# Patient Record
Sex: MALE | Race: BLACK OR AFRICAN AMERICAN | Employment: PART TIME | RURAL
[De-identification: names, ages, dates, MRNs, and addresses within clinical notes are randomized per-mention and may not be internally consistent; named-entity substitution may affect disease eponyms.]

---

## 2022-01-11 ENCOUNTER — TELEPHONE (OUTPATIENT)
Dept: FAMILY MEDICINE CLINIC | Age: 42
End: 2022-01-11

## 2022-02-28 ENCOUNTER — OFFICE VISIT (OUTPATIENT)
Dept: FAMILY MEDICINE CLINIC | Age: 42
End: 2022-02-28
Payer: MEDICAID

## 2022-02-28 VITALS
WEIGHT: 315 LBS | SYSTOLIC BLOOD PRESSURE: 145 MMHG | DIASTOLIC BLOOD PRESSURE: 84 MMHG | OXYGEN SATURATION: 99 % | RESPIRATION RATE: 22 BRPM | HEART RATE: 80 BPM | HEIGHT: 74 IN | TEMPERATURE: 98.3 F | BODY MASS INDEX: 40.43 KG/M2

## 2022-02-28 DIAGNOSIS — R74.8 ELEVATED LIVER ENZYMES: ICD-10-CM

## 2022-02-28 DIAGNOSIS — Z79.899 ENCOUNTER FOR LONG-TERM (CURRENT) USE OF OTHER MEDICATIONS: ICD-10-CM

## 2022-02-28 DIAGNOSIS — I10 BENIGN HYPERTENSION: Primary | ICD-10-CM

## 2022-02-28 DIAGNOSIS — E11.9 TYPE 2 DIABETES MELLITUS WITHOUT COMPLICATION, WITHOUT LONG-TERM CURRENT USE OF INSULIN (HCC): ICD-10-CM

## 2022-02-28 DIAGNOSIS — E78.2 MIXED HYPERLIPIDEMIA: ICD-10-CM

## 2022-02-28 PROBLEM — E66.01 MORBID OBESITY (HCC): Status: ACTIVE | Noted: 2022-02-28

## 2022-02-28 PROBLEM — G47.33 OBSTRUCTIVE SLEEP APNEA SYNDROME: Status: ACTIVE | Noted: 2022-02-28

## 2022-02-28 LAB — HBA1C MFR BLD HPLC: 6.7 %

## 2022-02-28 PROCEDURE — 99204 OFFICE O/P NEW MOD 45 MIN: CPT | Performed by: FAMILY MEDICINE

## 2022-02-28 PROCEDURE — 83036 HEMOGLOBIN GLYCOSYLATED A1C: CPT | Performed by: FAMILY MEDICINE

## 2022-02-28 RX ORDER — METFORMIN HYDROCHLORIDE 1000 MG/1
1000 TABLET ORAL 2 TIMES DAILY WITH MEALS
Qty: 60 TABLET | Refills: 5 | Status: SHIPPED | OUTPATIENT
Start: 2022-02-28 | End: 2022-09-29 | Stop reason: SDUPTHER

## 2022-02-28 RX ORDER — METOPROLOL TARTRATE 100 MG/1
100 TABLET ORAL 2 TIMES DAILY
Qty: 60 TABLET | Refills: 5 | Status: SHIPPED | OUTPATIENT
Start: 2022-02-28 | End: 2022-08-23

## 2022-02-28 RX ORDER — HYDROCHLOROTHIAZIDE 25 MG/1
25 TABLET ORAL DAILY
COMMUNITY
Start: 2022-01-05 | End: 2022-03-30 | Stop reason: SDUPTHER

## 2022-02-28 RX ORDER — HYDRALAZINE HYDROCHLORIDE 50 MG/1
TABLET, FILM COATED ORAL
COMMUNITY
Start: 2021-12-02 | End: 2022-03-30 | Stop reason: SDUPTHER

## 2022-02-28 RX ORDER — AMLODIPINE BESYLATE 10 MG/1
10 TABLET ORAL DAILY
COMMUNITY
Start: 2022-02-02 | End: 2022-03-30 | Stop reason: SDUPTHER

## 2022-02-28 RX ORDER — METOPROLOL TARTRATE 50 MG/1
100 TABLET ORAL 2 TIMES DAILY
COMMUNITY
Start: 2022-02-08 | End: 2022-02-28 | Stop reason: SDUPTHER

## 2022-02-28 RX ORDER — ATORVASTATIN CALCIUM 40 MG/1
TABLET, FILM COATED ORAL
COMMUNITY
Start: 2022-01-31 | End: 2022-03-17 | Stop reason: SDUPTHER

## 2022-02-28 NOTE — PROGRESS NOTES
1. \"Have you been to the ER, urgent care clinic since your last visit? Hospitalized since your last visit? \" No    2. \"Have you seen or consulted any other health care providers outside of the [address] [place] [address] since your last visit? \" No     3. For patients aged 39-70: Has the patient had a colonoscopy / FIT/ Cologuard? NA - based on age      If the patient is female:    4. For patients aged 41-77: Has the patient had a mammogram within the past 2 years? NA - based on age or sex      11. For patients aged 21-65: Has the patient had a pap smear?  NA - based on age or sex    Health Maintenance Due   Topic Date Due    Hepatitis C Screening  Never done    Depression Screen  Never done    DTaP/Tdap/Td series (1 - Tdap) 02/08/2019    Lipid Screen  Never done

## 2022-02-28 NOTE — PATIENT INSTRUCTIONS
DASH Diet: Care Instructions  Your Care Instructions     The DASH diet is an eating plan that can help lower your blood pressure. DASH stands for Dietary Approaches to Stop Hypertension. Hypertension is high blood pressure. The DASH diet focuses on eating foods that are high in calcium, potassium, and magnesium. These nutrients can lower blood pressure. The foods that are highest in these nutrients are fruits, vegetables, low-fat dairy products, nuts, seeds, and legumes. But taking calcium, potassium, and magnesium supplements instead of eating foods that are high in those nutrients does not have the same effect. The DASH diet also includes whole grains, fish, and poultry. The DASH diet is one of several lifestyle changes your doctor may recommend to lower your high blood pressure. Your doctor may also want you to decrease the amount of sodium in your diet. Lowering sodium while following the DASH diet can lower blood pressure even further than just the DASH diet alone. Follow-up care is a key part of your treatment and safety. Be sure to make and go to all appointments, and call your doctor if you are having problems. It's also a good idea to know your test results and keep a list of the medicines you take. How can you care for yourself at home? Following the DASH diet  · Eat 4 to 5 servings of fruit each day. A serving is 1 medium-sized piece of fruit, ½ cup chopped or canned fruit, 1/4 cup dried fruit, or 4 ounces (½ cup) of fruit juice. Choose fruit more often than fruit juice. · Eat 4 to 5 servings of vegetables each day. A serving is 1 cup of lettuce or raw leafy vegetables, ½ cup of chopped or cooked vegetables, or 4 ounces (½ cup) of vegetable juice. Choose vegetables more often than vegetable juice. · Get 2 to 3 servings of low-fat and fat-free dairy each day. A serving is 8 ounces of milk, 1 cup of yogurt, or 1 ½ ounces of cheese. · Eat 6 to 8 servings of grains each day.  A serving is 1 slice of bread, 1 ounce of dry cereal, or ½ cup of cooked rice, pasta, or cooked cereal. Try to choose whole-grain products as much as possible. · Limit lean meat, poultry, and fish to 2 servings each day. A serving is 3 ounces, about the size of a deck of cards. · Eat 4 to 5 servings of nuts, seeds, and legumes (cooked dried beans, lentils, and split peas) each week. A serving is 1/3 cup of nuts, 2 tablespoons of seeds, or ½ cup of cooked beans or peas. · Limit fats and oils to 2 to 3 servings each day. A serving is 1 teaspoon of vegetable oil or 2 tablespoons of salad dressing. · Limit sweets and added sugars to 5 servings or less a week. A serving is 1 tablespoon jelly or jam, ½ cup sorbet, or 1 cup of lemonade. · Eat less than 2,300 milligrams (mg) of sodium a day. If you limit your sodium to 1,500 mg a day, you can lower your blood pressure even more. · Be aware that all of these are the suggested number of servings for people who eat 1,800 to 2,000 calories a day. Your recommended number of servings may be different if you need more or fewer calories. Tips for success  · Start small. Do not try to make dramatic changes to your diet all at once. You might feel that you are missing out on your favorite foods and then be more likely to not follow the plan. Make small changes, and stick with them. Once those changes become habit, add a few more changes. · Try some of the following:  ? Make it a goal to eat a fruit or vegetable at every meal and at snacks. This will make it easy to get the recommended amount of fruits and vegetables each day. ? Try yogurt topped with fruit and nuts for a snack or healthy dessert. ? Add lettuce, tomato, cucumber, and onion to sandwiches. ? Combine a ready-made pizza crust with low-fat mozzarella cheese and lots of vegetable toppings. Try using tomatoes, squash, spinach, broccoli, carrots, cauliflower, and onions. ?  Have a variety of cut-up vegetables with a low-fat dip as an appetizer instead of chips and dip. ? Sprinkle sunflower seeds or chopped almonds over salads. Or try adding chopped walnuts or almonds to cooked vegetables. ? Try some vegetarian meals using beans and peas. Add garbanzo or kidney beans to salads. Make burritos and tacos with mashed dong beans or black beans. Where can you learn more? Go to http://www.guidry.com/  Enter H967 in the search box to learn more about \"DASH Diet: Care Instructions. \"  Current as of: April 29, 2021               Content Version: 13.0  © 6116-2882 [employer]. Care instructions adapted under license by [employer] (which disclaims liability or warranty for this information). If you have questions about a medical condition or this instruction, always ask your healthcare professional. [address] any warranty or liability for your use of this information.

## 2022-02-28 NOTE — PROGRESS NOTES
[facility]    History of Present Illness:   Cari Mcleod is a 42 y.o. male here for   Chief Complaint   Patient presents with    [facility]         HPI:  Patient is a former patient of mine who comes here today to establish for hypertension and hyperlipidemia. Last office visit I ordered referral to have hepatology due to elevated liver enzymes and unfortunately it has been rescheduled to next month. He says he has cut back on bread, etoh but still drinks a sixpack every few days. He also was checked for diabetes last office visit and his A1c was 7.2    He also has sleep apnea and has ordered supplies recently. He has taken all his meds this morning and admits to drinking alcohol yesterday. He continues to smoke. Past Medical, Family, and Social History:     Past Medical History:   Diagnosis Date    Benign hypertension 2/28/2022    Mixed hyperlipidemia 2/28/2022    Morbid obesity ([facility] [address] 2/28/2022    Obstructive sleep apnea syndrome 2/28/2022      Current Outpatient Medications on File Prior to Visit   Medication Sig Dispense Refill    amLODIPine (NORVASC) 10 mg tablet Take 10 mg by mouth daily.  atorvastatin (LIPITOR) 40 mg tablet TAKE 1 TABLET BY MOUTH AT BEDTIME FOR HEART/STROKE RISK REDUCTION      hydrALAZINE (APRESOLINE) 50 mg tablet TAKE 1 & 1/2 (ONE & ONE-HALF) TABLETS BY MOUTH THREE TIMES DAILY WITH FOOD      hydroCHLOROthiazide (HYDRODIURIL) 25 mg tablet Take 25 mg by mouth daily.  [DISCONTINUED] metoprolol tartrate (LOPRESSOR) 50 mg tablet Take 100 mg by mouth two (2) times a day. No current facility-administered medications on file prior to visit.        Patient Active Problem List   Diagnosis Code    Mixed hyperlipidemia E78.2    Benign hypertension I10    Morbid obesity ([facility] [address] E66.01    Type 2 diabetes mellitus without complication, without long-term current use of insulin ([facility] [address] E11.9    Elevated liver enzymes R74.8    Obstructive sleep apnea syndrome G47.33       Social History     Socioeconomic History    Marital status: SINGLE   Tobacco Use    Smoking status: Current Every Day Smoker     Packs/day: 0.50     Years: 25.00     Pack years: 12.50     Types: Cigarettes    Smokeless tobacco: Never Used   Substance and Sexual Activity    Alcohol use: Yes     Alcohol/week: 18.0 standard drinks     Types: 18 Cans of beer per week    Drug use: Never        Review of Systems   Review of Systems   Constitutional: Negative for chills and fever. Respiratory: Negative for cough and shortness of breath. Cardiovascular: Negative for chest pain. Gastrointestinal: Negative for abdominal pain. Objective:     Visit Vitals  BP (!) 145/84   Pulse 80   Temp 98.3 °F (36.8 °C) (Oral)   Resp 22   Ht 6' 2\" (1.88 m)   Wt (!) 412 lb (186.9 kg)   SpO2 99%   BMI 52.90 kg/m²        Physical Exam  PHYSICAL EXAM:  Gen: Pt sitting in chair, in NAD, morbidly obese  Head: Normocephalic, atraumatic  Eyes: Sclera anicteric, EOM grossly intact,  Ears: TM's pearly with good light reflex b/l  Neck: Supple, no carotid bruits  CVS: Normal S1, S2, no m/r/g  Resp: CTAB, no wheezes or rales  Abd: Soft, non-tender, non-distended, +BS  Extrem:1+ Edema  Skin: Warm, dry  Neuro: Alert, oriented, appropriate    Pertinent Labs/Studies:  Testing preformed onsite at today's visit:  Results for orders placed or performed in visit on 02/28/22   AMB POC HEMOGLOBIN A1C   Result Value Ref Range    Hemoglobin A1c (POC) 6.7 %       Assessment and orders:       ICD-10-CM ICD-9-CM    1. Benign hypertension  I10 401.1 CBC W/O DIFF      METABOLIC PANEL, COMPREHENSIVE      LIPID PANEL      metoprolol tartrate (LOPRESSOR) 100 mg IR tablet      LIPID PANEL      METABOLIC PANEL, COMPREHENSIVE      CBC W/O DIFF   2. Mixed hyperlipidemia  E78.2 272.2 LIPID PANEL      LIPID PANEL   3.  Encounter for long-term (current) use of other medications  Z79.899 V58.69 CBC W/O DIFF      METABOLIC PANEL, COMPREHENSIVE      METABOLIC PANEL, COMPREHENSIVE      CBC W/O DIFF   4. Elevated liver enzymes  B68.5 002.2 METABOLIC PANEL, COMPREHENSIVE      METABOLIC PANEL, COMPREHENSIVE   5. Type 2 diabetes mellitus without complication, without long-term current use of insulin (HCC)  E11.9 250.00 AMB POC HEMOGLOBIN A1C      COLLECTION CAPILLARY BLOOD SPECIMEN      metFORMIN (GLUCOPHAGE) 1,000 mg tablet      MICROALBUMIN, UR, RAND W/ MICROALB/CREAT RATIO     Diagnoses and all orders for this visit:    1. Benign hypertension  Assessment & Plan:   uncontrolled, changes made today: increase metoprolol, f/u 1 week for bp check    Orders:  -     CBC W/O DIFF; Future  -     METABOLIC PANEL, COMPREHENSIVE; Future  -     LIPID PANEL; Future  -     metoprolol tartrate (LOPRESSOR) 100 mg IR tablet; Take 1 Tablet by mouth two (2) times a day. 2. Mixed hyperlipidemia  -     LIPID PANEL; Future    3. Encounter for long-term (current) use of other medications  -     CBC W/O DIFF; Future  -     METABOLIC PANEL, COMPREHENSIVE; Future    4. Elevated liver enzymes  Assessment & Plan:   unclear control, continue current medications, lifestyle modifications recommended   Advised patient to D/C alcohol entirely and keep appointment with hepatology    Orders:  -     METABOLIC PANEL, COMPREHENSIVE; Future    5. Type 2 diabetes mellitus without complication, without long-term current use of insulin (HCC)  Assessment & Plan:  Start metformin, discussed diabetic diet, check micro       Orders:  -     AMB POC HEMOGLOBIN A1C  -     COLLECTION CAPILLARY BLOOD SPECIMEN  -     metFORMIN (GLUCOPHAGE) 1,000 mg tablet; Take 1 Tablet by mouth two (2) times daily (with meals). -     MICROALBUMIN, UR, RAND W/ MICROALB/CREAT RATIO; Future    Follow-up and Dispositions    · Return in about 1 week (around 3/7/2022). I advised the patient to stop smoking and I discussed the long term consequences of smoking.     I have discussed the diagnosis with the patient and the intended plan as seen in the above orders. Social history, medical history, and labs were reviewed. The patient has received an after-visit summary and questions were answered concerning future plans. I have discussed medication side effects and warnings with the patient as well. Patient/guardian verbalized understanding and accepts plan & risks.      MD RODDY Kumar & NAMITA DYE [facility]  02/28/22

## 2022-02-28 NOTE — ASSESSMENT & PLAN NOTE
unclear control, continue current medications, lifestyle modifications recommended   Advised patient to D/C alcohol entirely and keep appointment with hepatology

## 2022-03-01 LAB
ALBUMIN SERPL-MCNC: 3.9 G/DL (ref 3.5–5)
ALBUMIN/GLOB SERPL: 0.9 {RATIO} (ref 1.1–2.2)
ALP SERPL-CCNC: 120 U/L (ref 45–117)
ALT SERPL-CCNC: 51 U/L (ref 12–78)
ANION GAP SERPL CALC-SCNC: 3 MMOL/L (ref 5–15)
AST SERPL-CCNC: 87 U/L (ref 15–37)
BILIRUB SERPL-MCNC: 0.5 MG/DL (ref 0.2–1)
BUN SERPL-MCNC: 12 MG/DL (ref 6–20)
BUN/CREAT SERPL: 12 (ref 12–20)
CALCIUM SERPL-MCNC: 9.4 MG/DL (ref 8.5–10.1)
CHLORIDE SERPL-SCNC: 102 MMOL/L (ref 97–108)
CHOLEST SERPL-MCNC: 131 MG/DL
CO2 SERPL-SCNC: 31 MMOL/L (ref 21–32)
CREAT SERPL-MCNC: 0.97 MG/DL (ref 0.7–1.3)
ERYTHROCYTE [DISTWIDTH] IN BLOOD BY AUTOMATED COUNT: 16 % (ref 11.5–14.5)
GLOBULIN SER CALC-MCNC: 4.4 G/DL (ref 2–4)
GLUCOSE SERPL-MCNC: 144 MG/DL (ref 65–100)
HCT VFR BLD AUTO: 44.4 % (ref 36.6–50.3)
HDLC SERPL-MCNC: 42 MG/DL
HDLC SERPL: 3.1 {RATIO} (ref 0–5)
HGB BLD-MCNC: 13.7 G/DL (ref 12.1–17)
LDLC SERPL CALC-MCNC: 68 MG/DL (ref 0–100)
MCH RBC QN AUTO: 26.4 PG (ref 26–34)
MCHC RBC AUTO-ENTMCNC: 30.9 G/DL (ref 30–36.5)
MCV RBC AUTO: 85.5 FL (ref 80–99)
NRBC # BLD: 0 K/UL (ref 0–0.01)
NRBC BLD-RTO: 0 PER 100 WBC
PLATELET # BLD AUTO: 269 K/UL (ref 150–400)
PMV BLD AUTO: 12.6 FL (ref 8.9–12.9)
POTASSIUM SERPL-SCNC: 4.6 MMOL/L (ref 3.5–5.1)
PROT SERPL-MCNC: 8.3 G/DL (ref 6.4–8.2)
RBC # BLD AUTO: 5.19 M/UL (ref 4.1–5.7)
SODIUM SERPL-SCNC: 136 MMOL/L (ref 136–145)
TRIGL SERPL-MCNC: 105 MG/DL (ref ?–150)
VLDLC SERPL CALC-MCNC: 21 MG/DL
WBC # BLD AUTO: 9.3 K/UL (ref 4.1–11.1)

## 2022-03-01 NOTE — PROGRESS NOTES
AST slightly elevated c/w etoh use. Glucose 144, hgba1c elevated in office and started on metformin. Unable to give urine specimen so micro not done. Patient aware.

## 2022-03-03 PROBLEM — J45.20 MILD INTERMITTENT ASTHMA WITHOUT COMPLICATION: Status: ACTIVE | Noted: 2022-03-03

## 2022-03-09 ENCOUNTER — OFFICE VISIT (OUTPATIENT)
Dept: FAMILY MEDICINE CLINIC | Age: 42
End: 2022-03-09
Payer: MEDICAID

## 2022-03-09 VITALS
HEIGHT: 74 IN | OXYGEN SATURATION: 95 % | TEMPERATURE: 97.8 F | DIASTOLIC BLOOD PRESSURE: 80 MMHG | BODY MASS INDEX: 40.43 KG/M2 | HEART RATE: 76 BPM | RESPIRATION RATE: 18 BRPM | WEIGHT: 315 LBS | SYSTOLIC BLOOD PRESSURE: 140 MMHG

## 2022-03-09 DIAGNOSIS — Z23 ENCOUNTER FOR IMMUNIZATION: ICD-10-CM

## 2022-03-09 DIAGNOSIS — G47.33 OBSTRUCTIVE SLEEP APNEA SYNDROME: ICD-10-CM

## 2022-03-09 DIAGNOSIS — I10 BENIGN HYPERTENSION: ICD-10-CM

## 2022-03-09 DIAGNOSIS — E11.9 TYPE 2 DIABETES MELLITUS WITHOUT COMPLICATION, WITHOUT LONG-TERM CURRENT USE OF INSULIN (HCC): Primary | ICD-10-CM

## 2022-03-09 PROCEDURE — 90732 PPSV23 VACC 2 YRS+ SUBQ/IM: CPT | Performed by: FAMILY MEDICINE

## 2022-03-09 PROCEDURE — 99213 OFFICE O/P EST LOW 20 MIN: CPT | Performed by: FAMILY MEDICINE

## 2022-03-09 NOTE — ASSESSMENT & PLAN NOTE
Improved from last week. Will continue current meds, monitor bp, follow dash diet. Patient/guardian verbalized understanding and accepts plan & risks.

## 2022-03-09 NOTE — ASSESSMENT & PLAN NOTE
uncontrolled, continue current medications, advised patient to start back wearing cpap as DANIELLE uncontrolled can worsen hypertension

## 2022-03-09 NOTE — PROGRESS NOTES
HPI:  Here for f/u diabetes, htn. Started on metformin last week due to hgba1c 6.7 (2nd elevation) and fasting glucose 144. He is checking his fingerstick blood sugars. BP was elevated so I increased metoprolol last week. Unable to take acei due to h/o angioedema. Scheduled to see hepatology due to elevated liver enzymes  next month. He has cut back on etoh. He also has sleep apnea and has ordered supplies recently. He has been without CPAP for the past month. He continues to smoke. Past Medical, Family, and Social History:     Past Medical History:   Diagnosis Date    Benign hypertension 2/28/2022    Mixed hyperlipidemia 2/28/2022    Morbid obesity ([facility] [address] 2/28/2022    Obstructive sleep apnea syndrome 2/28/2022      Current Outpatient Medications on File Prior to Visit   Medication Sig Dispense Refill    amLODIPine (NORVASC) 10 mg tablet Take 10 mg by mouth daily.  atorvastatin (LIPITOR) 40 mg tablet TAKE 1 TABLET BY MOUTH AT BEDTIME FOR HEART/STROKE RISK REDUCTION      hydrALAZINE (APRESOLINE) 50 mg tablet TAKE 1 & 1/2 (ONE & ONE-HALF) TABLETS BY MOUTH THREE TIMES DAILY WITH FOOD      hydroCHLOROthiazide (HYDRODIURIL) 25 mg tablet Take 25 mg by mouth daily.  [DISCONTINUED] metoprolol tartrate (LOPRESSOR) 50 mg tablet Take 100 mg by mouth two (2) times a day. No current facility-administered medications on file prior to visit.        Patient Active Problem List   Diagnosis Code    Mixed hyperlipidemia E78.2    Benign hypertension I10    Morbid obesity ([facility] [address] E66.01    Type 2 diabetes mellitus without complication, without long-term current use of insulin ([facility] [address] E11.9    Elevated liver enzymes R74.8    Obstructive sleep apnea syndrome G47.33       Social History     Socioeconomic History    Marital status: SINGLE   Tobacco Use    Smoking status: Current Every Day Smoker     Packs/day: 0.50     Years: 25.00     Pack years: 12.50     Types: Cigarettes    Smokeless tobacco: Never Used   Substance and Sexual Activity    Alcohol use: Yes     Alcohol/week: 18.0 standard drinks     Types: 18 Cans of beer per week    Drug use: Never        Review of Systems   Review of Systems   Constitutional: Negative for chills and fever. Respiratory: Negative for cough and shortness of breath. Cardiovascular: Negative for chest pain. Gastrointestinal: Negative for abdominal pain. Objective:     Visit Vitals  BP (!) 145/84   Pulse 80   Temp 98.3 °F (36.8 °C) (Oral)   Resp 22   Ht 6' 2\" (1.88 m)   Wt (!) 412 lb (186.9 kg)   SpO2 99%   BMI 52.90 kg/m²      Repeat bp 140/80    Physical Exam  PHYSICAL EXAM:  Gen: Pt sitting in chair, in NAD, morbidly obese  Head: Normocephalic, atraumatic  Eyes: Sclera anicteric, EOM grossly intact,   CVS: Normal S1, S2, no m/r/g  Resp: CTAB, no wheezes or rales  Extrem:1+ Edema  Skin: Warm, dry  Neuro: Alert, oriented, appropriate    Diabetic foot exam:        Left Foot:   Visual Exam: callous - sole   Pulse DP: 2+ (normal)   Filament test: normal sensation          Right Foot:   Visual Exam: callous - sole   Pulse DP: 2+ (normal)   Filament test: normal sensation      Diagnoses and all orders for this visit:    1. Type 2 diabetes mellitus without complication, without long-term current use of insulin (HCC)  Assessment & Plan:   at goal, continue current medications   F/u 3 months    Orders:  -     REFERRAL TO OPTOMETRY  -     MICROALBUMIN, UR, RAND W/ MICROALB/CREAT RATIO    2. Benign hypertension  Assessment & Plan:  Improved from last week. Will continue current meds, monitor bp, follow dash diet. Patient/guardian verbalized understanding and accepts plan & risks. 3. Encounter for immunization  -     diphth,pertus,acell,,tetanus (BOOSTRIX TDAP) 2.5-8-5 Lf-mcg-Lf/0.5mL susp suspension; 0.5 mL by IntraMUSCular route once for 1 dose.   -     PNEUMOCOCCAL POLYSACCHARIDE VACCINE, 23-VALENT, ADULT OR IMMUNOSUPPRESSED PT DOSE,    4. Obstructive sleep apnea syndrome  Assessment & Plan:   uncontrolled, continue current medications, advised patient to start back wearing cpap as DANIELLE uncontrolled can worsen hypertension      follow up 3 months      I have discussed the diagnosis with the patient and the intended plan as seen in the above orders. Social history, medical history, and labs were reviewed. The patient has received an after-visit summary and questions were answered concerning future plans. I have discussed medication side effects and warnings with the patient as well. Patient/guardian verbalized understanding and accepts plan & risks.      MD RODDY Faust & NAMITA DYE [facility]  3/9/22    Tiffani Grant MD

## 2022-03-09 NOTE — PATIENT INSTRUCTIONS
Diabetes Foot Health: Care Instructions  Your Care Instructions     When you have diabetes, your feet need extra care and attention. Diabetes can damage the nerve endings and blood vessels in your feet, making you less likely to notice when your feet are injured. Diabetes also limits your body's ability to fight infection and get blood to areas that need it. If you get a minor foot injury, it could become an ulcer or a serious infection. With good foot care, you can prevent most of these problems. Caring for your feet can be quick and easy. Most of the care can be done when you are bathing or getting ready for bed. Follow-up care is a key part of your treatment and safety. Be sure to make and go to all appointments, and call your doctor if you are having problems. It's also a good idea to know your test results and keep a list of the medicines you take. How can you care for yourself at home? · Keep your blood sugar close to normal by watching what and how much you eat, monitoring blood sugar, taking medicines if prescribed, and getting regular exercise. · Do not smoke. Smoking affects blood flow and can make foot problems worse. If you need help quitting, talk to your doctor about stop-smoking programs and medicines. These can increase your chances of quitting for good. · Eat a diet that is low in fats. High fat intake can cause fat to build up in your blood vessels and decrease blood flow. · Inspect your feet daily for blisters, cuts, cracks, or sores. If you cannot see well, use a mirror or have someone help you. · Take care of your feet:  ? Wash your feet every day. Use warm (not hot) water. Check the water temperature with your wrists or other part of your body, not your feet. ? Dry your feet well. Pat them dry. Do not rub the skin on your feet too hard. Dry well between your toes. If the skin on your feet stays moist, bacteria or a fungus can grow, which can lead to infection. ?  Keep your skin soft. Use moisturizing skin cream to keep the skin on your feet soft and prevent calluses and cracks. But do not put the cream between your toes, and stop using any cream that causes a rash. ? Clean underneath your toenails carefully. Do not use a sharp object to clean underneath your toenails. Use the blunt end of a nail file or other rounded tool. ? Trim and file your toenails straight across to prevent ingrown toenails. Use a nail clipper, not scissors. Use an emery board to smooth the edges. · Change socks daily. Socks without seams are best, because seams often rub the feet. You can find socks for people with diabetes from specialty catalogs. · Look inside your shoes every day for things like gravel or torn linings, which could cause blisters or sores. · Buy shoes that fit well:  ? Look for shoes that have plenty of space around the toes. This helps prevent bunions and blisters. ? Try on shoes while wearing the kind of socks you will usually wear with the shoes. ? Avoid plastic shoes. They may rub your feet and cause blisters. Good shoes should be made of materials that are flexible and breathable, such as leather or cloth. ? Break in new shoes slowly by wearing them for no more than an hour a day for several days. Take extra time to check your feet for red areas, blisters, or other problems after you wear new shoes. · Do not go barefoot. Do not wear sandals, and do not wear shoes with very thin soles. Thin soles are easy to puncture. They also do not protect your feet from hot pavement or cold weather. · Have your doctor check your feet during each visit. If you have a foot problem, see your doctor. Do not try to treat an early foot problem at home. Home remedies or treatments that you can buy without a prescription (such as corn removers) can be harmful. · Always get early treatment for foot problems. A minor irritation can lead to a major problem if not properly cared for early.   When should you call for help? Call your doctor now or seek immediate medical care if:    · You have a foot sore, an ulcer or break in the skin that is not healing after 4 days, bleeding corns or calluses, or an ingrown toenail.     · You have blue or black areas, which can mean bruising or blood flow problems.     · You have peeling skin or tiny blisters between your toes or cracking or oozing of the skin.     · You have a fever for more than 24 hours and a foot sore.     · You have new numbness or tingling in your feet that does not go away after you move your feet or change positions.     · You have unexplained or unusual swelling of the foot or ankle. Watch closely for changes in your health, and be sure to contact your doctor if:    · You cannot do proper foot care. Where can you learn more? Go to http://www.gray.com/  Enter A739 in the search box to learn more about \"Diabetes Foot Health: Care Instructions. \"  Current as of: July 28, 2021               Content Version: 13.2  © 2006-2022 [employer]. Care instructions adapted under license by [employer] (which disclaims liability or warranty for this information). If you have questions about a medical condition or this instruction, always ask your healthcare professional. [address] any warranty or liability for your use of this information.

## 2022-03-10 LAB
CREAT UR-MCNC: 224 MG/DL
MICROALBUMIN UR-MCNC: 7.11 MG/DL
MICROALBUMIN/CREAT UR-RTO: 32 MG/G (ref 0–30)

## 2022-03-17 RX ORDER — ATORVASTATIN CALCIUM 40 MG/1
40 TABLET, FILM COATED ORAL DAILY
Qty: 90 TABLET | Refills: 1 | Status: SHIPPED | OUTPATIENT
Start: 2022-03-17 | End: 2022-09-06

## 2022-03-18 PROBLEM — E11.9 TYPE 2 DIABETES MELLITUS WITHOUT COMPLICATION, WITHOUT LONG-TERM CURRENT USE OF INSULIN (HCC): Status: ACTIVE | Noted: 2022-02-28

## 2022-03-18 PROBLEM — I10 BENIGN HYPERTENSION: Status: ACTIVE | Noted: 2022-02-28

## 2022-03-18 PROBLEM — R74.8 ELEVATED LIVER ENZYMES: Status: ACTIVE | Noted: 2022-02-28

## 2022-03-19 PROBLEM — E66.01 MORBID OBESITY (HCC): Status: ACTIVE | Noted: 2022-02-28

## 2022-03-19 PROBLEM — G47.33 OBSTRUCTIVE SLEEP APNEA SYNDROME: Status: ACTIVE | Noted: 2022-02-28

## 2022-03-19 PROBLEM — E78.2 MIXED HYPERLIPIDEMIA: Status: ACTIVE | Noted: 2022-02-28

## 2022-03-20 PROBLEM — J45.20 MILD INTERMITTENT ASTHMA WITHOUT COMPLICATION: Status: ACTIVE | Noted: 2022-03-03

## 2022-03-30 ENCOUNTER — TELEPHONE (OUTPATIENT)
Dept: FAMILY MEDICINE CLINIC | Age: 42
End: 2022-03-30

## 2022-03-30 DIAGNOSIS — I10 BENIGN HYPERTENSION: ICD-10-CM

## 2022-03-30 RX ORDER — METOPROLOL TARTRATE 100 MG/1
100 TABLET ORAL 2 TIMES DAILY
Qty: 60 TABLET | Refills: 5 | Status: CANCELLED | OUTPATIENT
Start: 2022-03-30

## 2022-03-30 NOTE — TELEPHONE ENCOUNTER
Pt states when he called about his referral to the eye dr they told him: they did not have any information on him, they need a referral and office notes sent first so that they can schedule pt.

## 2022-03-31 RX ORDER — HYDRALAZINE HYDROCHLORIDE 50 MG/1
TABLET, FILM COATED ORAL
Qty: 135 TABLET | Refills: 1 | Status: SHIPPED | OUTPATIENT
Start: 2022-03-31 | End: 2022-09-29 | Stop reason: SDUPTHER

## 2022-03-31 RX ORDER — HYDROCHLOROTHIAZIDE 25 MG/1
25 TABLET ORAL DAILY
Qty: 90 TABLET | Refills: 1 | Status: SHIPPED | OUTPATIENT
Start: 2022-03-31 | End: 2022-09-06

## 2022-03-31 RX ORDER — AMLODIPINE BESYLATE 10 MG/1
10 TABLET ORAL DAILY
Qty: 90 TABLET | Refills: 1 | Status: SHIPPED | OUTPATIENT
Start: 2022-03-31 | End: 2022-10-13

## 2022-04-06 ENCOUNTER — TELEPHONE (OUTPATIENT)
Dept: FAMILY MEDICINE CLINIC | Age: 42
End: 2022-04-06

## 2022-04-06 NOTE — TELEPHONE ENCOUNTER
----- Message from Mookiemarciaiza Oh sent at 4/6/2022 11:00 AM EDT -----  Subject: Message to Provider    QUESTIONS  Information for Provider? Letting the office know that he has scheduled   his eye appointment.  ---------------------------------------------------------------------------  --------------  [address]  What is the best way for the office to contact you? OK to leave message on   voicemail  Preferred Call Back Phone Number? [phone]  ---------------------------------------------------------------------------  --------------  SCRIPT ANSWERS  Relationship to Patient?  Self

## 2022-04-06 NOTE — TELEPHONE ENCOUNTER
----- Message from Deshaun Shore sent at 4/6/2022 11:00 AM EDT -----  Subject: Message to Provider    QUESTIONS  Information for Provider? Letting the office know that he has scheduled   his eye appointment.  ---------------------------------------------------------------------------  --------------  [address]  What is the best way for the office to contact you? OK to leave message on   voicemail  Preferred Call Back Phone Number? [phone]  ---------------------------------------------------------------------------  --------------  SCRIPT ANSWERS  Relationship to Patient?  Self

## 2022-04-26 ENCOUNTER — OFFICE VISIT (OUTPATIENT)
Dept: FAMILY MEDICINE CLINIC | Age: 42
End: 2022-04-26
Payer: MEDICAID

## 2022-04-26 VITALS
SYSTOLIC BLOOD PRESSURE: 107 MMHG | DIASTOLIC BLOOD PRESSURE: 71 MMHG | WEIGHT: 315 LBS | TEMPERATURE: 98.8 F | BODY MASS INDEX: 40.43 KG/M2 | OXYGEN SATURATION: 95 % | HEIGHT: 74 IN | RESPIRATION RATE: 20 BRPM | HEART RATE: 78 BPM

## 2022-04-26 DIAGNOSIS — K76.0 FATTY LIVER: Primary | ICD-10-CM

## 2022-04-26 DIAGNOSIS — I10 BENIGN HYPERTENSION: ICD-10-CM

## 2022-04-26 DIAGNOSIS — Z23 ENCOUNTER FOR IMMUNIZATION: ICD-10-CM

## 2022-04-26 PROCEDURE — 90632 HEPA VACCINE ADULT IM: CPT | Performed by: FAMILY MEDICINE

## 2022-04-26 PROCEDURE — 99213 OFFICE O/P EST LOW 20 MIN: CPT | Performed by: FAMILY MEDICINE

## 2022-04-26 PROCEDURE — 90746 HEPB VACCINE 3 DOSE ADULT IM: CPT | Performed by: FAMILY MEDICINE

## 2022-04-26 NOTE — PROGRESS NOTES
[facility]    History of Present Illness:   Marnie Parker is a 42 y.o. male here for   Chief Complaint   Patient presents with    Immunization/Injection     hep A and B         HPI:  Here for follow-up fatty liver. He saw hematology on April 11. Had labs done that showed that he has no antibodies to hepatitis A or B. Recommended vaccination. He is again trying to cut back on alcohol. He says he actually does not eat a lot but he used to drink pretty heavily. He is still drinking daily though but has cut back a lot. His fingerstick blood sugars are usually 110s. He has scheduled an eye exam.    Wearing CPAP nightly. Pressure on April 11 was 137/83        Health Maintenance  Health Maintenance Due   Topic Date Due    Eye Exam Retinal or Dilated  Never done    DTaP/Tdap/Td series (1 - Tdap) 02/08/2019       Past Medical, Family, and Social History:     Past Medical History:   Diagnosis Date    Benign hypertension 2/28/2022    Elevated liver enzymes 2/28/2022    CT showed severe hepatic steatosis with developing surface nodularity to the liver, suspect early cirrhosis Referred to hepatology    Mild intermittent asthma without complication 7/7/2859    Mixed hyperlipidemia 2/28/2022    Morbid obesity ([facility] [address] 2/28/2022    Obstructive sleep apnea syndrome 2/28/2022      Current Outpatient Medications on File Prior to Visit   Medication Sig Dispense Refill    hydroCHLOROthiazide (HYDRODIURIL) 25 mg tablet Take 1 Tablet by mouth daily. 90 Tablet 1    amLODIPine (NORVASC) 10 mg tablet Take 1 Tablet by mouth daily. 90 Tablet 1    hydrALAZINE (APRESOLINE) 50 mg tablet TAKE 1 & 1/2 (ONE & ONE-HALF) TABLETS BY MOUTH THREE TIMES DAILY WITH FOOD 135 Tablet 1    atorvastatin (LIPITOR) 40 mg tablet Take 1 Tablet by mouth daily. 90 Tablet 1    metFORMIN (GLUCOPHAGE) 1,000 mg tablet Take 1 Tablet by mouth two (2) times daily (with meals).  60 Tablet 5    metoprolol tartrate (LOPRESSOR) 100 mg IR tablet Take 1 Tablet by mouth two (2) times a day. 60 Tablet 5     No current facility-administered medications on file prior to visit. Patient Active Problem List   Diagnosis Code    Mixed hyperlipidemia E78.2    Benign hypertension I10    Morbid obesity ([facility] [address] E66.01    Type 2 diabetes mellitus without complication, without long-term current use of insulin ([facility] [address] E11.9    Elevated liver enzymes R74.8    Obstructive sleep apnea syndrome G47.33    Mild intermittent asthma without complication E48.56    Fatty liver K76.0       Social History     Socioeconomic History    Marital status: SINGLE   Tobacco Use    Smoking status: Current Every Day Smoker     Packs/day: 0.50     Years: 25.00     Pack years: 12.50     Types: Cigarettes    Smokeless tobacco: Never Used   Substance and Sexual Activity    Alcohol use: Yes     Alcohol/week: 18.0 standard drinks     Types: 18 Cans of beer per week    Drug use: Never        Review of Systems   Review of Systems   Constitutional: Negative for fever. Respiratory: Negative for cough and shortness of breath. Cardiovascular: Negative for chest pain. Gastrointestinal: Negative for abdominal pain. Objective:     Visit Vitals  /71   Pulse 78   Temp 98.8 °F (37.1 °C) (Oral)   Resp 20   Ht 6' 2\" (1.88 m)   Wt (!) 413 lb 12.8 oz (187.7 kg)   SpO2 95%   BMI 53.13 kg/m²        Physical Exam  PHYSICAL EXAM:  Gen: Pt sitting in chair, in NAD  Head: Normocephalic, atraumatic  Eyes: Sclera anicteric, EOM grossly intact,  CVS: Normal S1, S2, no m/r/g  Resp: CTAB, no wheezes or rales  Neuro: Alert, oriented, appropriate          Assessment and orders:       ICD-10-CM ICD-9-CM    1. Fatty liver  K76.0 571.8    2. Benign hypertension  I10 401.1    3. Encounter for immunization  Z23 V03.89 HEPATITIS A VACCINE, ADULT DOSAGE, IM      HEPATITIS B VACCINE, ADULT DOSAGE, IM     Diagnoses and all orders for this visit:    1. Fatty liver    2.  Benign hypertension    3. Encounter for immunization  -     HEPATITIS A VACCINE, ADULT DOSAGE, IM  -     HEPATITIS B VACCINE, ADULT DOSAGE, IM      Follow-up and Dispositions    · Return in about 2 months (around 6/26/2022). reviewed diet, exercise and weight control  very strongly urged to quit smoking to reduce cardiovascular risk  Discussed taking use to help him with both alcohol and smoking cessation. Consider joining AA locally. Distraction is key. Counseled on diet and exercise. First hep A and B has been given today. Follow-up in 2 months for recheck of diabetes as well as second hep B. Spent 25 min with patient, reviewing chart and face to face exam, clinical documentation. I have discussed the diagnosis with the patient and the intended plan as seen in the above orders. Social history, medical history, and labs were reviewed. The patient has received an after-visit summary and questions were answered concerning future plans. I have discussed medication side effects and warnings with the patient as well. Patient/guardian verbalized understanding and accepts plan & risks.      MD RODDY Diehl & NAMITA DYE [facility]  04/26/22

## 2022-04-26 NOTE — PATIENT INSTRUCTIONS

## 2022-04-26 NOTE — PROGRESS NOTES
1. \"Have you been to the ER, urgent care clinic since your last visit? Hospitalized since your last visit? \" No    2. \"Have you seen or consulted any other health care providers outside of the [address] [place] [address] since your last visit? \" No     3. For patients aged 39-70: Has the patient had a colonoscopy / FIT/ Cologuard? NA - based on age      If the patient is female:    4. For patients aged 41-77: Has the patient had a mammogram within the past 2 years? NA - based on age or sex      11. For patients aged 21-65: Has the patient had a pap smear?  NA - based on age or sex    Health Maintenance Due   Topic Date Due    Hepatitis C Screening  Never done    Eye Exam Retinal or Dilated  Never done    DTaP/Tdap/Td series (1 - Tdap) 02/08/2019

## 2022-06-09 ENCOUNTER — OFFICE VISIT (OUTPATIENT)
Dept: FAMILY MEDICINE CLINIC | Age: 42
End: 2022-06-09
Payer: MEDICAID

## 2022-06-09 VITALS
HEIGHT: 74 IN | RESPIRATION RATE: 16 BRPM | HEART RATE: 69 BPM | TEMPERATURE: 98.3 F | DIASTOLIC BLOOD PRESSURE: 83 MMHG | WEIGHT: 315 LBS | BODY MASS INDEX: 40.43 KG/M2 | OXYGEN SATURATION: 96 % | SYSTOLIC BLOOD PRESSURE: 130 MMHG

## 2022-06-09 DIAGNOSIS — E11.9 TYPE 2 DIABETES MELLITUS WITHOUT COMPLICATION, WITHOUT LONG-TERM CURRENT USE OF INSULIN (HCC): Primary | ICD-10-CM

## 2022-06-09 LAB — HBA1C MFR BLD HPLC: 6.9 %

## 2022-06-09 PROCEDURE — 99213 OFFICE O/P EST LOW 20 MIN: CPT | Performed by: FAMILY MEDICINE

## 2022-06-09 PROCEDURE — 83036 HEMOGLOBIN GLYCOSYLATED A1C: CPT | Performed by: FAMILY MEDICINE

## 2022-06-09 PROCEDURE — 3044F HG A1C LEVEL LT 7.0%: CPT | Performed by: FAMILY MEDICINE

## 2022-06-09 NOTE — PROGRESS NOTES
Nicole Moses is a 42 y.o. male and presents with Follow-up (check A1C 3 month f/u diabetic, metformin )  . Subjective:  Diabetes Mellitus:  He has diabetes mellitus, and  hypertension and hyperlipidemia. Diabetic ROS - medication compliance: compliant most of the time, home glucose monitoring: is performed regularly. FSBS <200        Review of Systems  Review of Systems   Constitutional: Negative for chills and fever. Respiratory: Negative for cough and shortness of breath. Past Medical History:   Diagnosis Date    Benign hypertension 2/28/2022    Elevated liver enzymes 2/28/2022    CT showed severe hepatic steatosis with developing surface nodularity to the liver, suspect early cirrhosis Referred to hepatology    Mild intermittent asthma without complication 1/6/4548    Mixed hyperlipidemia 2/28/2022    Morbid obesity ([address] 2/28/2022    Obstructive sleep apnea syndrome 2/28/2022     History reviewed. No pertinent surgical history. Social History     Socioeconomic History    Marital status: SINGLE   Tobacco Use    Smoking status: Current Every Day Smoker     Packs/day: 0.50     Years: 25.00     Pack years: 12.50     Types: Cigarettes    Smokeless tobacco: Never Used   Substance and Sexual Activity    Alcohol use: Yes     Alcohol/week: 18.0 standard drinks     Types: 18 Cans of beer per week    Drug use: Never     Family History   Problem Relation Age of Onset    Diabetes Father     Stroke Father      Current Outpatient Medications   Medication Sig Dispense Refill    hydroCHLOROthiazide (HYDRODIURIL) 25 mg tablet Take 1 Tablet by mouth daily. 90 Tablet 1    amLODIPine (NORVASC) 10 mg tablet Take 1 Tablet by mouth daily. 90 Tablet 1    hydrALAZINE (APRESOLINE) 50 mg tablet TAKE 1 & 1/2 (ONE & ONE-HALF) TABLETS BY MOUTH THREE TIMES DAILY WITH FOOD 135 Tablet 1    atorvastatin (LIPITOR) 40 mg tablet Take 1 Tablet by mouth daily.  90 Tablet 1    metFORMIN (GLUCOPHAGE) 1,000 mg tablet Take 1 Tablet by mouth two (2) times daily (with meals). (Patient taking differently: Take 1,000 mg by mouth two (2) times daily (with meals). Pt states he takes a half a pill x2 day.) 60 Tablet 5    metoprolol tartrate (LOPRESSOR) 100 mg IR tablet Take 1 Tablet by mouth two (2) times a day. 60 Tablet 5     Allergies   Allergen Reactions    Lisinopril Angioedema       Objective:  Visit Vitals  /83 (BP 1 Location: Right upper arm, BP Patient Position: Sitting, BP Cuff Size: Large adult long)   Pulse 69   Temp 98.3 °F (36.8 °C) (Oral)   Resp 16   Ht 6' 2\" (1.88 m)   Wt (!) 413 lb 9.6 oz (187.6 kg)   SpO2 96%   BMI 53.10 kg/m²     Physical Exam:   PHYSICAL EXAM:  Gen: Pt sitting in chair, in NAD  Head: Normocephalic, atraumatic  Eyes: Sclera anicteric, EOM grossly intact,  CVS: Normal S1, S2, no m/r/g  Resp: CTAB, no wheezes or rales  Neuro: Alert, oriented, appropriate    Testing preformed onsite at today's visit:  Results for orders placed or performed in visit on 06/09/22   AMB POC HEMOGLOBIN A1C   Result Value Ref Range    Hemoglobin A1c (POC) 6.9 %           Assessment/Plan:    Diagnoses and all orders for this visit:    1. Type 2 diabetes mellitus without complication, without long-term current use of insulin ([facility] [address]  Assessment & Plan:   well controlled, continue current medications       Orders:  -     AMB POC HEMOGLOBIN A1C  -     COLLECTION CAPILLARY BLOOD SPECIMEN   Spent 25 min with patient, reviewing chart and face to face exam, clinical documentation. We discussed the expected course, resolution and complications of the diagnosis(es) in detail. Medication risks, benefits, costs, interactions, and alternatives were discussed as indicated. I advised him to contact the office if his condition worsens, changes or fails to improve as anticipated. He expressed understanding with the diagnosis(es) and plan.  Patient understands that this encounter was a temporary measure, and the importance of further follow up and examination was emphasized. Patient verbalized understanding. Follow-up and Dispositions    · Return in about 6 months (around 12/9/2022) for diabetes.         Muna Granados MD

## 2022-06-09 NOTE — PROGRESS NOTES
1. Have you been to the ER, urgent care clinic since your last visit? Hospitalized since your last visit? No    2. Have you seen or consulted any other health care providers outside of the [address] [place] [address] since your last visit? Include any pap smears or colon screening. [facility] referral by dr Lyndsay Villavicencio - 2 months ago     3. For patients aged 39-70: Has the patient had a colonoscopy / FIT/ Cologuard? No    If the patient is female:    4. For patients aged 41-77: Has the patient had a mammogram within the past 2 years? NA - based on age or sex      11. For patients aged 21-65: Has the patient had a pap smear? NA - based on age or sex     Reviewed record in preparation for visit and have necessary documentation  Pt did not bring medication to office visit for review  Patient is accompanied by self I have received verbal consent from Daryl Reed to discuss any/all medical information while they are present in the room.     Goals that were addressed and/or need to be completed during or after this appointment include     Health Maintenance Due   Topic Date Due    Eye Exam Retinal or Dilated  Never done    DTaP/Tdap/Td series (1 - Tdap) 02/08/2019

## 2022-06-09 NOTE — PATIENT INSTRUCTIONS
Learning About Meal Planning for Diabetes  Why plan your meals? Meal planning can be a key part of managing diabetes. Planning meals and snacks with the right balance of carbohydrate, protein, and fat can help you keep your blood sugar at the target level you set with your doctor. You don't have to eat special foods. You can eat what your family eats, including sweets once in a while. But you do have to pay attention to how often you eat and how much you eat of certain foods. You may want to work with a dietitian or a diabetes educator. They can give you tips and meal ideas and can answer your questions about meal planning. This health professional can also help you reach a healthy weight if that is one of your goals. What plan is right for you? Your dietitian or diabetes educator may suggest that you start with the plate format or carbohydrate counting. The plate format  The plate format is a simple way to help you manage how you eat. You plan meals by learning how much space each food should take on a plate. Using the plate format helps you manage the amount of carbohydrate you eat. It can make it easier to keep your blood sugar level within your target range. It also helps you see if you're eating healthy portion sizes. To use the plate format, you put non-starchy vegetables on half your plate. Add lean protein foods, such as fish, lean meats and poultry, or soy products, on one-quarter of the plate. Put a grain or starchy vegetable (such as brown rice or a potato) on the final quarter of the plate. You can add a small piece of fruit and some low-fat or fat-free milk or yogurt, depending on your carbohydrate goal for each meal.  Here are some tips for using the plate format:  · Make sure that you are not using an oversized plate. A 9-inch plate is best. Many restaurants use larger plates. · Get used to using the plate format at home. Then you can use it when you eat out.   · Write down your questions about using the plate format. Talk to your doctor, a dietitian, or a diabetes educator about your concerns. Carbohydrate counting  With carbohydrate counting, you plan meals based on the amount of carbohydrate in each food. Carbohydrate raises blood sugar higher and more quickly than any other nutrient. It is found in desserts, breads and cereals, and fruit. It's also found in starchy vegetables such as potatoes and corn, grains such as rice and pasta, and milk and yogurt. You can help keep your blood sugar levels within your target range by planning how much carbohydrate to have at meals and snacks. The amount you need depends on several things. These include your weight, how active you are, which diabetes medicines you take, and what your goals are for your blood sugar levels. A registered dietitian or diabetes educator can help you plan how much carbohydrate to include in each meal and snack. An example of a carbohydrate counting plan is:  · 45 to 60 grams at each meal. That's about the same as 3 to 4 carbohydrate servings. · 15 to 20 grams at each snack. That's about the same as 1 carbohydrate serving. The Nutrition Facts label on packaged foods tells you how much carbohydrate is in a serving of the food. First, look at the serving size on the food label. Is that the amount you eat in a serving? All of the nutrition information on a food label is based on that serving size. So if you eat more or less than that, you'll need to adjust the other numbers. Total carbohydrate is the next thing you need to look for on the label. If you count carbohydrate servings, one serving of carbohydrate is 15 grams. For foods that don't come with labels, such as fresh fruits and vegetables, you'll need a guide that lists carbohydrate in these foods. Ask your doctor, dietitian, or diabetes educator about books or other nutrition guides you can use.   If you take insulin, you need to know how many grams of carbohydrate are in a meal. This lets you know how much rapid-acting insulin to take before you eat. If you use an insulin pump, you get a constant rate of insulin during the day. So the pump must be programmed at meals to give you extra insulin to cover the rise in blood sugar after meals. When you know how much carbohydrate you will eat, you can take the right amount of insulin. Or, if you always use the same amount of insulin, you need to make sure that you eat the same amount of carbohydrate at meals. If you need more help to understand carbohydrate counting and food labels, ask your doctor, dietitian, or diabetes educator. How can you plan healthy meals? Here are some tips to get started:  · Plan your meals a week at a time. Don't forget to include snacks too. · Use cookbooks or online recipes to plan several main meals. Plan some quick meals for busy nights. You also can double some recipes that freeze well. Then you can save half for other busy nights when you don't have time to cook. · Make sure you have the ingredients you need for your recipes. If you're running low on basic items, put these items on your shopping list too. · List foods that you use to make breakfasts, lunches, and snacks. List plenty of fruits and vegetables. · Post this list on the refrigerator. Add to it as you think of more things you need. · Take the list to the store to do your weekly shopping. Follow-up care is a key part of your treatment and safety. Be sure to make and go to all appointments, and call your doctor if you are having problems. It's also a good idea to know your test results and keep a list of the medicines you take. Where can you learn more? Go to http://www.gray.com/  Enter D632 in the search box to learn more about \"Learning About Meal Planning for Diabetes. \"  Current as of: September 8, 2021               Content Version: 13.2  © 1684-9234 [employer]wise, [facility].    Care instructions adapted under license by [employer] (which disclaims liability or warranty for this information). If you have questions about a medical condition or this instruction, always ask your healthcare professional. [address] any warranty or liability for your use of this information.

## 2022-06-27 ENCOUNTER — OFFICE VISIT (OUTPATIENT)
Dept: FAMILY MEDICINE CLINIC | Age: 42
End: 2022-06-27
Payer: MEDICAID

## 2022-06-27 ENCOUNTER — TELEPHONE (OUTPATIENT)
Dept: FAMILY MEDICINE CLINIC | Age: 42
End: 2022-06-27

## 2022-06-27 VITALS
OXYGEN SATURATION: 98 % | HEART RATE: 80 BPM | WEIGHT: 315 LBS | SYSTOLIC BLOOD PRESSURE: 128 MMHG | TEMPERATURE: 97.7 F | RESPIRATION RATE: 20 BRPM | BODY MASS INDEX: 40.43 KG/M2 | DIASTOLIC BLOOD PRESSURE: 78 MMHG | HEIGHT: 74 IN

## 2022-06-27 DIAGNOSIS — Z23 ENCOUNTER FOR IMMUNIZATION: Primary | ICD-10-CM

## 2022-06-27 PROCEDURE — 90746 HEPB VACCINE 3 DOSE ADULT IM: CPT | Performed by: FAMILY MEDICINE

## 2022-06-27 NOTE — TELEPHONE ENCOUNTER
Pt wants to know when does he need to come back for his second HEP injection. Please call Pt.  Thanks

## 2022-08-23 DIAGNOSIS — I10 BENIGN HYPERTENSION: ICD-10-CM

## 2022-08-23 RX ORDER — METOPROLOL TARTRATE 100 MG/1
TABLET ORAL
Qty: 60 TABLET | Refills: 0 | Status: SHIPPED | OUTPATIENT
Start: 2022-08-23 | End: 2022-09-25

## 2022-09-06 RX ORDER — HYDROCHLOROTHIAZIDE 25 MG/1
TABLET ORAL
Qty: 90 TABLET | Refills: 0 | Status: SHIPPED | OUTPATIENT
Start: 2022-09-06 | End: 2022-09-25

## 2022-09-06 RX ORDER — ATORVASTATIN CALCIUM 40 MG/1
TABLET, FILM COATED ORAL
Qty: 90 TABLET | Refills: 0 | Status: SHIPPED | OUTPATIENT
Start: 2022-09-06 | End: 2022-09-29 | Stop reason: SDUPTHER

## 2022-09-23 DIAGNOSIS — I10 BENIGN HYPERTENSION: ICD-10-CM

## 2022-09-25 RX ORDER — METOPROLOL TARTRATE 100 MG/1
TABLET ORAL
Qty: 60 TABLET | Refills: 0 | Status: SHIPPED | OUTPATIENT
Start: 2022-09-25 | End: 2022-09-29 | Stop reason: SDUPTHER

## 2022-09-25 RX ORDER — HYDROCHLOROTHIAZIDE 25 MG/1
TABLET ORAL
Qty: 90 TABLET | Refills: 0 | Status: SHIPPED | OUTPATIENT
Start: 2022-09-25

## 2022-09-29 ENCOUNTER — OFFICE VISIT (OUTPATIENT)
Dept: FAMILY MEDICINE CLINIC | Age: 42
End: 2022-09-29
Payer: MEDICAID

## 2022-09-29 VITALS
TEMPERATURE: 98.3 F | SYSTOLIC BLOOD PRESSURE: 122 MMHG | WEIGHT: 315 LBS | DIASTOLIC BLOOD PRESSURE: 80 MMHG | OXYGEN SATURATION: 99 % | HEIGHT: 74 IN | RESPIRATION RATE: 20 BRPM | BODY MASS INDEX: 40.43 KG/M2 | HEART RATE: 73 BPM

## 2022-09-29 DIAGNOSIS — G47.33 OBSTRUCTIVE SLEEP APNEA SYNDROME: ICD-10-CM

## 2022-09-29 DIAGNOSIS — R20.2 PARESTHESIA: ICD-10-CM

## 2022-09-29 DIAGNOSIS — M10.9 ACUTE GOUT, UNSPECIFIED CAUSE, UNSPECIFIED SITE: ICD-10-CM

## 2022-09-29 DIAGNOSIS — Z23 ENCOUNTER FOR IMMUNIZATION: ICD-10-CM

## 2022-09-29 DIAGNOSIS — I10 BENIGN HYPERTENSION: ICD-10-CM

## 2022-09-29 DIAGNOSIS — E11.9 TYPE 2 DIABETES MELLITUS WITHOUT COMPLICATION, WITHOUT LONG-TERM CURRENT USE OF INSULIN (HCC): Primary | ICD-10-CM

## 2022-09-29 DIAGNOSIS — E78.2 MIXED HYPERLIPIDEMIA: ICD-10-CM

## 2022-09-29 LAB — HBA1C MFR BLD HPLC: 7.1 %

## 2022-09-29 PROCEDURE — 99214 OFFICE O/P EST MOD 30 MIN: CPT | Performed by: FAMILY MEDICINE

## 2022-09-29 PROCEDURE — 83036 HEMOGLOBIN GLYCOSYLATED A1C: CPT | Performed by: FAMILY MEDICINE

## 2022-09-29 PROCEDURE — 3051F HG A1C>EQUAL 7.0%<8.0%: CPT | Performed by: FAMILY MEDICINE

## 2022-09-29 PROCEDURE — 90686 IIV4 VACC NO PRSV 0.5 ML IM: CPT | Performed by: FAMILY MEDICINE

## 2022-09-29 RX ORDER — ATORVASTATIN CALCIUM 40 MG/1
40 TABLET, FILM COATED ORAL DAILY
Qty: 90 TABLET | Refills: 1 | Status: SHIPPED | OUTPATIENT
Start: 2022-09-29

## 2022-09-29 RX ORDER — METFORMIN HYDROCHLORIDE 1000 MG/1
500 TABLET ORAL 2 TIMES DAILY WITH MEALS
Qty: 90 TABLET | Refills: 1 | Status: SHIPPED | OUTPATIENT
Start: 2022-09-29

## 2022-09-29 RX ORDER — HYDRALAZINE HYDROCHLORIDE 50 MG/1
TABLET, FILM COATED ORAL
Qty: 135 TABLET | Refills: 1 | Status: CANCELLED | OUTPATIENT
Start: 2022-09-29

## 2022-09-29 RX ORDER — HYDRALAZINE HYDROCHLORIDE 50 MG/1
TABLET, FILM COATED ORAL
Qty: 135 TABLET | Refills: 1 | Status: SHIPPED | OUTPATIENT
Start: 2022-09-29

## 2022-09-29 RX ORDER — HYDROCHLOROTHIAZIDE 25 MG/1
25 TABLET ORAL DAILY
Qty: 90 TABLET | Refills: 0 | Status: CANCELLED | OUTPATIENT
Start: 2022-09-29

## 2022-09-29 RX ORDER — METOPROLOL TARTRATE 100 MG/1
100 TABLET ORAL 2 TIMES DAILY
Qty: 180 TABLET | Refills: 1 | Status: SHIPPED | OUTPATIENT
Start: 2022-09-29

## 2022-09-29 RX ORDER — COLCHICINE 0.6 MG/1
TABLET ORAL
Qty: 9 TABLET | Refills: 1 | Status: SHIPPED | OUTPATIENT
Start: 2022-09-29

## 2022-09-29 NOTE — PROGRESS NOTES
[facility]    History of Present Illness:   Aaliyah Gutiérrez. is a 36 y.o. male here for   Chief Complaint   Patient presents with    Follow Up Chronic Condition     DM    Other     Discuss medication to prevent gout. HPI:  Here for follow-up diabetes. He is taking his medications as directed. Last A1c in June was 6.9. History of gout whenever he eats shellfish--L foot. He has avoided shrimp but would really like to have. His last flare was a year ago. He saw hepatology and likely has early cirrhosis. He still drinking alcohol --drinks #2 40 oz a couple of times a week. Smoker. No fruits or veggies, no sugary drinks. No exercise. Asthma not an issue. Due for hep a and B shots next month. Willing to get flu shot today. He has sleep apnea and needs a new mask for his CPAP. Health Maintenance  Health Maintenance Due   Topic Date Due    DTaP/Tdap/Td series (1 - Tdap) 02/08/2019       Past Medical, Family, and Social History:     Past Medical History:   Diagnosis Date    Benign hypertension 2/28/2022    Elevated liver enzymes 2/28/2022    CT showed severe hepatic steatosis with developing surface nodularity to the liver, suspect early cirrhosis Referred to hepatology    Mild intermittent asthma without complication 4/3/7080    Mixed hyperlipidemia 2/28/2022    Morbid obesity ([address] 2/28/2022    Obstructive sleep apnea syndrome 2/28/2022      History reviewed. No pertinent surgical history. Current Outpatient Medications on File Prior to Visit   Medication Sig Dispense Refill    hydroCHLOROthiazide (HYDRODIURIL) 25 mg tablet Take 1 tablet by mouth once daily 90 Tablet 0    amLODIPine (NORVASC) 10 mg tablet Take 1 Tablet by mouth daily.  90 Tablet 1    [DISCONTINUED] metoprolol tartrate (LOPRESSOR) 100 mg IR tablet Take 1 tablet by mouth twice daily 60 Tablet 0    [DISCONTINUED] atorvastatin (LIPITOR) 40 mg tablet Take 1 tablet by mouth once daily 90 Tablet 0 [DISCONTINUED] hydrALAZINE (APRESOLINE) 50 mg tablet TAKE 1 & 1/2 (ONE & ONE-HALF) TABLETS BY MOUTH THREE TIMES DAILY WITH FOOD 135 Tablet 1    [DISCONTINUED] metFORMIN (GLUCOPHAGE) 1,000 mg tablet Take 1 Tablet by mouth two (2) times daily (with meals). (Patient taking differently: Take 1,000 mg by mouth two (2) times daily (with meals). Pt states he takes a half a pill x2 day.) 60 Tablet 5     No current facility-administered medications on file prior to visit. Patient Active Problem List   Diagnosis Code    Mixed hyperlipidemia E78.2    Benign hypertension I10    Morbid obesity ([facility] E66.01    Type 2 diabetes mellitus without complication, without long-term current use of insulin ([facility] E11.9    Elevated liver enzymes R74.8    Obstructive sleep apnea syndrome G47.33    Mild intermittent asthma without complication C14.81    Fatty liver K76.0       Social History     Socioeconomic History    Marital status: SINGLE   Tobacco Use    Smoking status: Every Day     Packs/day: 0.50     Years: 25.00     Pack years: 12.50     Types: Cigarettes    Smokeless tobacco: Never   Substance and Sexual Activity    Alcohol use: Yes     Alcohol/week: 18.0 standard drinks     Types: 18 Cans of beer per week    Drug use: Never     Social Determinants of Health     Financial Resource Strain: Low Risk     Difficulty of Paying Living Expenses: Not hard at all   Food Insecurity: No Food Insecurity    Worried About Running Out of Food in the Last Year: Never true    Ran Out of Food in the Last Year: Never true        Review of Systems   Review of Systems   Constitutional:  Negative for fever. Respiratory:  Negative for cough and shortness of breath. Cardiovascular:  Negative for chest pain. Gastrointestinal:  Negative for abdominal pain. Neurological:  Positive for tingling. Negative for headaches.      Objective:   Visit Vitals  /80 (BP 1 Location: Right upper arm, BP Patient Position: Sitting, BP Cuff Size: Large adult)   Pulse 73   Temp 98.3 °F (36.8 °C) (Oral)   Resp 20   Ht 6' 2\" (1.88 m)   Wt (!) 419 lb 9.6 oz (190.3 kg)   SpO2 99%   BMI 53.87 kg/m²        Physical Exam  PHYSICAL EXAM:  Gen: Pt sitting in chair, in NAD morbidly obese  Head: Normocephalic, atraumatic  Eyes: Sclera anicteric, EOM grossly intact,  Ears: TM's pearly with good light reflex b/l  CVS: Normal S1, S2, no m/r/g  Resp: CTAB, no wheezes or rales  Abd: Soft, non-tender, non-distended, +BS  Extrem: Atraumatic, no cyanosis or edema  Pulses: 2+   Skin: Warm, dry  Neuro: Alert, oriented, appropriate    Pertinent Labs/Studies:       Testing preformed onsite at today's visit:  Results for orders placed or performed in visit on 09/29/22   AMB POC HEMOGLOBIN A1C   Result Value Ref Range    Hemoglobin A1c (POC) 7.1 %       Assessment and orders:       ICD-10-CM ICD-9-CM    1. Type 2 diabetes mellitus without complication, without long-term current use of insulin (HCC)  E11.9 250.00 AMB POC HEMOGLOBIN A1C      COLLECTION CAPILLARY BLOOD SPECIMEN      metFORMIN (GLUCOPHAGE) 1,000 mg tablet      2. Encounter for immunization  Z23 V03.89 INFLUENZA, FLUARIX, FLULAVAL, FLUZONE (AGE 6 MO+), AFLURIA(AGE 3Y+) IM, PF, 0.5 ML      3. Benign hypertension  I10 401.1 metoprolol tartrate (LOPRESSOR) 100 mg IR tablet      hydrALAZINE (APRESOLINE) 50 mg tablet      4. Acute gout, unspecified cause, unspecified site  M10.9 274.01 colchicine 0.6 mg tablet      URIC ACID      URIC ACID      CANCELED: URIC ACID      5. Paresthesia  R20.2 782.0 VITAMIN B12 & FOLATE      6. Obstructive sleep apnea syndrome  G47.33 327.23       7. Mixed hyperlipidemia  E78.2 272.2 atorvastatin (LIPITOR) 40 mg tablet        Diagnoses and all orders for this visit:    1.  Type 2 diabetes mellitus without complication, without long-term current use of insulin ([facility]  Assessment & Plan:   well controlled, continue current medications    Orders:  -     AMB POC HEMOGLOBIN A1C  -     COLLECTION CAPILLARY BLOOD SPECIMEN  -     metFORMIN (GLUCOPHAGE) 1,000 mg tablet; Take 0.5 Tablets by mouth two (2) times daily (with meals). 2. Encounter for immunization  -     INFLUENZA, FLUARIX, FLULAVAL, FLUZONE (AGE 6 MO+), AFLURIA(AGE 3Y+) IM, PF, 0.5 ML    3. Benign hypertension  Assessment & Plan:   well controlled, Will continue hydrochlorothiazide for now as he has not had any recent gout flareups. If it becomes an issue could increase hydralazine or switch to spironolactone. Orders:  -     metoprolol tartrate (LOPRESSOR) 100 mg IR tablet; Take 1 Tablet by mouth two (2) times a day. -     hydrALAZINE (APRESOLINE) 50 mg tablet; TAKE 1 & 1/2 (ONE & ONE-HALF) TABLETS BY MOUTH THREE TIMES DAILY WITH FOOD    4. Acute gout, unspecified cause, unspecified site  -     colchicine 0.6 mg tablet; Take 2 tablets p.o. now and then repeat 1 tablet in 1 hour, do not repeat for 3 days  -     URIC ACID; Future    5. Paresthesia  -     VITAMIN B12 & FOLATE; Future    6. Obstructive sleep apnea syndrome    7. Mixed hyperlipidemia  -     atorvastatin (LIPITOR) 40 mg tablet; Take 1 Tablet by mouth daily. the following changes in treatment are made: Use colchicine as needed gout, discussed effect of alcohol can have on gout as well as worsening cirrhosis. I counseled him extensively again. He does not drink every day and feels like he can stop on his own.  lab results and schedule of future lab studies reviewed with patient  reviewed diet, exercise and weight control  reviewed medications and side effects in detail    I have discussed the diagnosis with the patient and the intended plan as seen in the above orders. Social history, medical history, and labs were reviewed. The patient has received an after-visit summary and questions were answered concerning future plans. I have discussed medication side effects and warnings with the patient as well. Patient/guardian verbalized understanding and accepts plan & risks.    Please note that this dictation was completed with [employer], the computer voice recognition software. Quite often unanticipated grammatical, syntax, homophones, and other interpretive errors are inadvertently transcribed by the computer software. Please disregard these errors. Please excuse any errors that have escaped final proofreading. Thank you.      MD RODDY Alcocer & NAMITA DYE [facility]  09/29/22

## 2022-09-29 NOTE — ASSESSMENT & PLAN NOTE
well controlled, Will continue hydrochlorothiazide for now as he has not had any recent gout flareups. If it becomes an issue could increase hydralazine or switch to spironolactone.

## 2022-09-30 LAB — URATE SERPL-MCNC: 8.5 MG/DL (ref 3.5–7.2)

## 2022-10-13 RX ORDER — AMLODIPINE BESYLATE 10 MG/1
TABLET ORAL
Qty: 90 TABLET | Refills: 0 | Status: SHIPPED | OUTPATIENT
Start: 2022-10-13

## 2022-10-26 ENCOUNTER — OFFICE VISIT (OUTPATIENT)
Dept: FAMILY MEDICINE CLINIC | Age: 42
End: 2022-10-26
Payer: MEDICAID

## 2022-10-26 VITALS
BODY MASS INDEX: 40.43 KG/M2 | RESPIRATION RATE: 19 BRPM | HEART RATE: 90 BPM | HEIGHT: 74 IN | OXYGEN SATURATION: 95 % | DIASTOLIC BLOOD PRESSURE: 98 MMHG | WEIGHT: 315 LBS | TEMPERATURE: 98.4 F | SYSTOLIC BLOOD PRESSURE: 160 MMHG

## 2022-10-26 DIAGNOSIS — I10 BENIGN HYPERTENSION: Primary | ICD-10-CM

## 2022-10-26 DIAGNOSIS — Z23 ENCOUNTER FOR IMMUNIZATION: ICD-10-CM

## 2022-10-26 PROCEDURE — 90632 HEPA VACCINE ADULT IM: CPT | Performed by: FAMILY MEDICINE

## 2022-10-26 PROCEDURE — 99212 OFFICE O/P EST SF 10 MIN: CPT | Performed by: FAMILY MEDICINE

## 2022-10-26 PROCEDURE — 3078F DIAST BP <80 MM HG: CPT | Performed by: FAMILY MEDICINE

## 2022-10-26 PROCEDURE — 3074F SYST BP LT 130 MM HG: CPT | Performed by: FAMILY MEDICINE

## 2022-10-26 PROCEDURE — 90746 HEPB VACCINE 3 DOSE ADULT IM: CPT | Performed by: FAMILY MEDICINE

## 2022-10-26 NOTE — PROGRESS NOTES
Chief Complaint   Patient presents with    Immunization/Injection     1. \"Have you been to the ER, urgent care clinic since your last visit? Hospitalized since your last visit? \" No    2. \"Have you seen or consulted any other health care providers outside of the [address] [place] [address] since your last visit? \" No     3. For patients aged 39-70: Has the patient had a colonoscopy / FIT/ Cologuard? NA - based on age      If the patient is female:    4. For patients aged 41-77: Has the patient had a mammogram within the past 2 years? NA - based on age or sex      11. For patients aged 21-65: Has the patient had a pap smear?  NA - based on age or sex    Health Maintenance Due   Topic Date Due    DTaP/Tdap/Td series (1 - Tdap) 02/08/2019    COVID-19 Vaccine (3 - Booster for [place] series) 01/07/2022    Hepatitis B Vaccine (3 of 3 - Risk 3-dose series) 10/27/2022

## 2022-10-26 NOTE — PROGRESS NOTES
[facility]    History of Present Illness:   Collette Ponce is a 36 y.o. male here for   Chief Complaint   Patient presents with    Immunization/Injection         HPI:  Here for hep B and A injections. He did not take his blood pressure pills yet this morning. He denies any chest pain or headache. As he feels fine. Health Maintenance  Health Maintenance Due   Topic Date Due    DTaP/Tdap/Td series (1 - Tdap) 02/08/2019    COVID-19 Vaccine (3 - Booster for [place] series) 01/07/2022       Past Medical, Family, and Social History:     Past Medical History:   Diagnosis Date    Benign hypertension 2/28/2022    Elevated liver enzymes 2/28/2022    CT showed severe hepatic steatosis with developing surface nodularity to the liver, suspect early cirrhosis Referred to hepatology    Mild intermittent asthma without complication 4/0/0625    Mixed hyperlipidemia 2/28/2022    Morbid obesity ([address] 2/28/2022    Obstructive sleep apnea syndrome 2/28/2022      No past surgical history on file. Current Outpatient Medications on File Prior to Visit   Medication Sig Dispense Refill    amLODIPine (NORVASC) 10 mg tablet Take 1 tablet by mouth once daily 90 Tablet 0    metoprolol tartrate (LOPRESSOR) 100 mg IR tablet Take 1 Tablet by mouth two (2) times a day. 180 Tablet 1    metFORMIN (GLUCOPHAGE) 1,000 mg tablet Take 0.5 Tablets by mouth two (2) times daily (with meals). 90 Tablet 1    atorvastatin (LIPITOR) 40 mg tablet Take 1 Tablet by mouth daily. 90 Tablet 1    colchicine 0.6 mg tablet Take 2 tablets p.o. now and then repeat 1 tablet in 1 hour, do not repeat for 3 days 9 Tablet 1    hydrALAZINE (APRESOLINE) 50 mg tablet TAKE 1 & 1/2 (ONE & ONE-HALF) TABLETS BY MOUTH THREE TIMES DAILY WITH FOOD 135 Tablet 1    hydroCHLOROthiazide (HYDRODIURIL) 25 mg tablet Take 1 tablet by mouth once daily 90 Tablet 0     No current facility-administered medications on file prior to visit.        Patient Active Problem List   Diagnosis Code    Mixed hyperlipidemia E78.2    Benign hypertension I10    Morbid obesity ([facility] E66.01    Type 2 diabetes mellitus without complication, without long-term current use of insulin ([facility] E11.9    Elevated liver enzymes R74.8    Obstructive sleep apnea syndrome G47.33    Mild intermittent asthma without complication S64.12    Fatty liver K76.0       Social History     Socioeconomic History    Marital status: SINGLE   Tobacco Use    Smoking status: Every Day     Packs/day: 0.50     Years: 25.00     Pack years: 12.50     Types: Cigarettes    Smokeless tobacco: Never   Substance and Sexual Activity    Alcohol use: Yes     Alcohol/week: 18.0 standard drinks     Types: 18 Cans of beer per week    Drug use: Never     Social Determinants of Health     Financial Resource Strain: Low Risk     Difficulty of Paying Living Expenses: Not hard at all   Food Insecurity: No Food Insecurity    Worried About Running Out of Food in the Last Year: Never true    Ran Out of Food in the Last Year: Never true        Review of Systems   Review of Systems   Cardiovascular:  Negative for chest pain. Neurological:  Negative for dizziness and headaches.      Objective:   Visit Vitals  BP (!) 160/98 (BP 1 Location: Left upper arm, BP Patient Position: Sitting, BP Cuff Size: Large adult long)   Pulse 90   Temp 98.4 °F (36.9 °C) (Oral)   Resp 19   Ht 6' 2\" (1.88 m)   Wt (!) 427 lb (193.7 kg)   SpO2 95%   BMI 54.82 kg/m²      Vitals:    10/26/22 0823 10/26/22 0835 10/26/22 0848   BP: (!) 147/95 (!) 152/105 (!) 160/98   BP 1 Location: Left lower arm Left lower arm Left upper arm   BP Patient Position: Sitting Sitting Sitting   BP Cuff Size: Adult Adult Large adult long   Pulse: 90     Temp: 98.4 °F (36.9 °C)     TempSrc: Oral     Resp: 19     Height: 6' 2\" (1.88 m)     Weight: (!) 427 lb (193.7 kg)     SpO2: 95%          Physical Exam  PHYSICAL EXAM:  Gen: Pt sitting in chair, in NAD  Head: Normocephalic, atraumatic  Eyes: Sclera anicteric, EOM grossly intact  CVS: Normal S1, S2, no m/r/g  Resp: CTAB, no wheezes or rales  Neuro: Alert, oriented, appropriate    Pertinent Labs/Studies:  3 most recent PHQ Screens 10/26/2022   Little interest or pleasure in doing things Not at all   Feeling down, depressed, irritable, or hopeless Not at all   Total Score PHQ 2 0      No flowsheet data found. Assessment and orders:       ICD-10-CM ICD-9-CM    1. Benign hypertension  I10 401.1       2. Encounter for immunization  Z23 V03.89 HI IMMUNIZ ADMIN,1 SINGLE/COMB VAC/TOXOID      HI IMMUNIZ ADMIN,1 SINGLE/COMB VAC/TOXOID      HEPATITIS A VACCINE, ADULT DOSAGE, IM      HEPATITIS B VACCINE, ADULT DOSAGE, IM        Diagnoses and all orders for this visit:    1. Benign hypertension  His bp here last month on meds was 122/80. Advised to go home and take medications. Monitor blood pressure at home. Keep follow-up appointment for early December. 2. Encounter for immunization  -     HI IMMUNIZ ADMIN,1 SINGLE/COMB VAC/TOXOID  -     HI IMMUNIZ ADMIN,1 SINGLE/COMB VAC/TOXOID  -     HEPATITIS A VACCINE, ADULT DOSAGE, IM  -     HEPATITIS B VACCINE, ADULT DOSAGE, IM      reviewed diet, exercise and weight control  reviewed medications and side effects in detail      I have discussed the diagnosis with the patient and the intended plan as seen in the above orders. Social history, medical history, and labs were reviewed. The patient has received an after-visit summary and questions were answered concerning future plans. I have discussed medication side effects and warnings with the patient as well. Patient/guardian verbalized understanding and accepts plan & risks. Please note that this dictation was completed with [employer], the [employer] voice recognition software. Quite often unanticipated grammatical, syntax, homophones, and other interpretive errors are inadvertently transcribed by the computer software.   Please disregard these errors. Please excuse any errors that have escaped final proofreading. Thank you.      MD RODDY Mcclellan & NAMITA DYE [facility]  10/26/22

## 2022-12-12 ENCOUNTER — OFFICE VISIT (OUTPATIENT)
Dept: FAMILY MEDICINE CLINIC | Age: 42
End: 2022-12-12
Payer: MEDICAID

## 2022-12-12 VITALS
OXYGEN SATURATION: 99 % | WEIGHT: 315 LBS | TEMPERATURE: 97.5 F | HEIGHT: 74 IN | RESPIRATION RATE: 20 BRPM | HEART RATE: 74 BPM | BODY MASS INDEX: 40.43 KG/M2 | DIASTOLIC BLOOD PRESSURE: 77 MMHG | SYSTOLIC BLOOD PRESSURE: 138 MMHG

## 2022-12-12 DIAGNOSIS — E66.01 MORBID OBESITY (HCC): ICD-10-CM

## 2022-12-12 DIAGNOSIS — Z79.899 ENCOUNTER FOR LONG-TERM (CURRENT) USE OF OTHER MEDICATIONS: ICD-10-CM

## 2022-12-12 DIAGNOSIS — E11.9 TYPE 2 DIABETES MELLITUS WITHOUT COMPLICATION, WITHOUT LONG-TERM CURRENT USE OF INSULIN (HCC): Primary | ICD-10-CM

## 2022-12-12 DIAGNOSIS — E78.2 MIXED HYPERLIPIDEMIA: ICD-10-CM

## 2022-12-12 DIAGNOSIS — I10 BENIGN HYPERTENSION: ICD-10-CM

## 2022-12-12 DIAGNOSIS — R20.2 PARESTHESIA: ICD-10-CM

## 2022-12-12 LAB — HBA1C MFR BLD HPLC: 7.1 %

## 2022-12-12 PROCEDURE — 3078F DIAST BP <80 MM HG: CPT | Performed by: FAMILY MEDICINE

## 2022-12-12 PROCEDURE — 99214 OFFICE O/P EST MOD 30 MIN: CPT | Performed by: FAMILY MEDICINE

## 2022-12-12 PROCEDURE — 3074F SYST BP LT 130 MM HG: CPT | Performed by: FAMILY MEDICINE

## 2022-12-12 PROCEDURE — 83036 HEMOGLOBIN GLYCOSYLATED A1C: CPT | Performed by: FAMILY MEDICINE

## 2022-12-12 PROCEDURE — 3051F HG A1C>EQUAL 7.0%<8.0%: CPT | Performed by: FAMILY MEDICINE

## 2022-12-12 RX ORDER — AMLODIPINE BESYLATE 10 MG/1
10 TABLET ORAL DAILY
Qty: 90 TABLET | Refills: 1 | Status: SHIPPED | OUTPATIENT
Start: 2022-12-12

## 2022-12-12 RX ORDER — HYDROCHLOROTHIAZIDE 25 MG/1
25 TABLET ORAL DAILY
Qty: 90 TABLET | Refills: 1 | Status: SHIPPED | OUTPATIENT
Start: 2022-12-12

## 2022-12-12 RX ORDER — HYDRALAZINE HYDROCHLORIDE 50 MG/1
TABLET, FILM COATED ORAL
Qty: 405 TABLET | Refills: 1 | Status: SHIPPED | OUTPATIENT
Start: 2022-12-12

## 2022-12-12 NOTE — PROGRESS NOTES
[facility]    History of Present Illness:   Therese Dumont is a 36 y.o. male here for   Chief Complaint   Patient presents with    Follow Up Chronic Condition    Diabetes         HPI:  Patient here for follow-up diabetes and hypertension. He has been taking his medications as directed. He has no complaints today. He is due for lab work. He does continue to drink some alcohol. He is due for follow-up with GI for ultrasound. He needs to call to reschedule his appointment. He does continue to smoke. Health Maintenance  There are no preventive care reminders to display for this patient. Past Medical, Family, and Social History:     Past Medical History:   Diagnosis Date    Benign hypertension 2/28/2022    Elevated liver enzymes 2/28/2022    CT showed severe hepatic steatosis with developing surface nodularity to the liver, suspect early cirrhosis Referred to hepatology    Mild intermittent asthma without complication 2/8/9049    Mixed hyperlipidemia 2/28/2022    Morbid obesity ([address] 2/28/2022    Obstructive sleep apnea syndrome 2/28/2022      History reviewed. No pertinent surgical history. Current Outpatient Medications   Medication Sig Dispense Refill    amLODIPine (NORVASC) 10 mg tablet Take 1 Tablet by mouth daily. 90 Tablet 1    hydrALAZINE (APRESOLINE) 50 mg tablet TAKE 1 & 1/2 (ONE & ONE-HALF) TABLETS BY MOUTH THREE TIMES DAILY WITH FOOD 405 Tablet 1    hydroCHLOROthiazide (HYDRODIURIL) 25 mg tablet Take 1 Tablet by mouth daily. 90 Tablet 1    metoprolol tartrate (LOPRESSOR) 100 mg IR tablet Take 1 Tablet by mouth two (2) times a day. 180 Tablet 1    metFORMIN (GLUCOPHAGE) 1,000 mg tablet Take 0.5 Tablets by mouth two (2) times daily (with meals). 90 Tablet 1    atorvastatin (LIPITOR) 40 mg tablet Take 1 Tablet by mouth daily.  90 Tablet 1    colchicine 0.6 mg tablet Take 2 tablets p.o. now and then repeat 1 tablet in 1 hour, do not repeat for 3 days 9 Tablet 1 Patient Active Problem List   Diagnosis Code    Mixed hyperlipidemia E78.2    Benign hypertension I10    Morbid obesity ([facility] E66.01    Type 2 diabetes mellitus without complication, without long-term current use of insulin ([facility] E11.9    Elevated liver enzymes R74.8    Obstructive sleep apnea syndrome G47.33    Mild intermittent asthma without complication L53.48    Fatty liver K76.0       Social History     Socioeconomic History    Marital status: SINGLE   Tobacco Use    Smoking status: Every Day     Packs/day: 0.50     Years: 25.00     Pack years: 12.50     Types: Cigarettes    Smokeless tobacco: Never   Substance and Sexual Activity    Alcohol use: Yes     Alcohol/week: 18.0 standard drinks     Types: 18 Cans of beer per week    Drug use: Never     Social Determinants of Health     Financial Resource Strain: Low Risk     Difficulty of Paying Living Expenses: Not hard at all   Food Insecurity: No Food Insecurity    Worried About Running Out of Food in the Last Year: Never true    Ran Out of Food in the Last Year: Never true        Review of Systems   Review of Systems   Constitutional:  Negative for chills and fever. HENT:  Positive for congestion. Respiratory:  Negative for cough and shortness of breath. Cardiovascular:  Negative for chest pain. Gastrointestinal:  Negative for abdominal pain.      Objective:   Visit Vitals  /77 (BP 1 Location: Right arm, BP Patient Position: Sitting, BP Cuff Size: Adult long)   Pulse 74   Temp 97.5 °F (36.4 °C) (Oral)   Resp 20   Ht 6' 2\" (1.88 m)   Wt (!) 425 lb (192.8 kg)   SpO2 99%   BMI 54.57 kg/m²        Physical Exam  PHYSICAL EXAM:  Gen: Pt sitting in chair, in NAD  Head: Normocephalic, atraumatic  Eyes: Sclera anicteric, EOM grossly intact,  Neck: Supple, no carotid bruits  CVS: Normal S1, S2, no m/r/g  Resp: CTAB, no wheezes or rales  Abd: Soft, non-tender, non-distended, +BS  Neuro: Alert, oriented, appropriate    Pertinent Labs/Studies:  3 most recent PHQ Screens 12/12/2022   Little interest or pleasure in doing things Not at all   Feeling down, depressed, irritable, or hopeless Not at all   Total Score PHQ 2 0      No flowsheet data found. Testing preformed onsite at today's visit:  Results for orders placed or performed in visit on 12/12/22   AMB POC HEMOGLOBIN A1C   Result Value Ref Range    Hemoglobin A1c (POC) 7.1 %       Assessment and orders:       ICD-10-CM ICD-9-CM    1. Type 2 diabetes mellitus without complication, without long-term current use of insulin (HCC)  E11.9 250.00 AMB POC HEMOGLOBIN A1C      2. Benign hypertension  I10 401.1 amLODIPine (NORVASC) 10 mg tablet      hydrALAZINE (APRESOLINE) 50 mg tablet      hydroCHLOROthiazide (HYDRODIURIL) 25 mg tablet      3. Paresthesia  R20.2 782.0       4. Encounter for long-term (current) use of other medications  Z79.899 V58.69 CBC WITH AUTOMATED DIFF      METABOLIC PANEL, COMPREHENSIVE      5. Morbid obesity ([facility]  E66.01 278.01       6. Mixed hyperlipidemia  E78.2 272.2 LIPID PANEL        Diagnoses and all orders for this visit:    1. Type 2 diabetes mellitus without complication, without long-term current use of insulin ([facility])  Assessment & Plan:  At goal    Orders:  -     AMB POC HEMOGLOBIN A1C    2. Benign hypertension  Assessment & Plan:  Well controlled, continue current meds    Orders:  -     amLODIPine (NORVASC) 10 mg tablet; Take 1 Tablet by mouth daily. -     hydrALAZINE (APRESOLINE) 50 mg tablet; TAKE 1 & 1/2 (ONE & ONE-HALF) TABLETS BY MOUTH THREE TIMES DAILY WITH FOOD  -     hydroCHLOROthiazide (HYDRODIURIL) 25 mg tablet; Take 1 Tablet by mouth daily. 3. Paresthesia    4. Encounter for long-term (current) use of other medications  -     CBC WITH AUTOMATED DIFF; Future  -     METABOLIC PANEL, COMPREHENSIVE; Future    5.  Morbid obesity ([facility]  Assessment & Plan:  RECOMMENDATIONS given include: D/W patient the risks of Obesity long term and D/W patient diet and exercise for medically needed weight loss. D/W him options of medical/surgical therapy for future consideration. Encouraged average 1 to 1.5 pound weight loss per week with diet and activity changes. I encouraged keeping a written daily log of calorie intake with the eventual goal of planning meals. Plan health in between meal snacks into the diet and keep these on hand to avoid high calorie snacks. If craving food at innappropriate times (when one should not be hungry on the diet) try drinking a glass of water, exercise. Avoid eating while watching television or reading. 6. Mixed hyperlipidemia  -     LIPID PANEL; Future    Follow-up and Dispositions    Return in about 6 months (around 6/12/2023) for diabetes. lab results and schedule of future lab studies reviewed with patient  reviewed diet, exercise and weight control  very strongly urged to quit smoking to reduce cardiovascular risk  specific diabetic recommendations: home glucose monitoring emphasized and glycohemoglobin and other lab monitoring discussed      I have discussed the diagnosis with the patient and the intended plan as seen in the above orders. Social history, medical history, and labs were reviewed. The patient has received an after-visit summary and questions were answered concerning future plans. I have discussed medication side effects and warnings with the patient as well. Patient/guardian verbalized understanding and accepts plan & risks. Please note that this dictation was completed with [employer], the computer voice recognition software. Quite often unanticipated grammatical, syntax, homophones, and other interpretive errors are inadvertently transcribed by the computer software. Please disregard these errors. Please excuse any errors that have escaped final proofreading. Thank you.      MD RODDY Aceves & NAMITA DYE [facility]  12/12/22

## 2022-12-12 NOTE — PATIENT INSTRUCTIONS
RECOMMENDATIONS given include: D/W patient the risks of Obesity long term and D/W patient diet and exercise for medically needed weight loss. D/W him options of medical/surgical therapy for future consideration. Encouraged average 1 to 1.5 pound weight loss per week with diet and activity changes. I encouraged keeping a written daily log of calorie intake with the eventual goal of planning meals. Plan health in between meal snacks into the diet and keep these on hand to avoid high calorie snacks. If craving food at innappropriate times (when one should not be hungry on the diet) try drinking a glass of water, exercise. Avoid eating while watching television or reading. A1c 7.1 at goal!  Quitting Tobacco: Care Instructions  Quitting tobacco is much harder than simply changing a habit. Nicotine cravings make it hard to quit, but you can do it. Your doctor will help you set up the plan that best meets your needs. You will miss the nicotine at first. You may feel short-tempered and grumpy. You may have trouble sleeping or thinking clearly. The urge to use tobacco may continue for a time. Combining tools can raise your chances of success. You can use medicine along with counseling. And you can join a quit-tobacco program, such as the American Lung Association's Freedom from Smoking program.    Get support. Reach out to family and friends, and find a counselor to help you quit. Join a support group, such as Nicotine Anonymous. Go to www.smokefree. gov to sign up for text messaging support. Talk to your doctor or pharmacist about medicines that can help you quit. Medicines can help with cravings and withdrawal symptoms. There are several over-the-counter choices, such as nicotine patches, gum, and lozenges. After you quit, do not use tobacco again, not even once. Get rid of all tobacco products and anything that reminds you of tobacco, such as ashtrays.      Avoid things that make you reach for tobacco. Change your daily routine. Take a different route to work, or eat a meal in a different place. Try to cut down on stress. Find ways to calm yourself, such as taking a hot bath or doing deep breathing exercises. Eat a healthy diet, and get regular exercise. Having healthy habits may help you quit using tobacco.     Don't give up on quitting if you use tobacco again. Most people quit and restart a few times before they quit for good. Follow-up care is a key part of your treatment and safety. Be sure to make and go to all appointments, and call your doctor if you are having problems. It's also a good idea to know your test results and keep a list of the medicines you take. Where can you learn more? Go to http://www.gray.com/  Enter [id number] in the search box to learn more about \"Quitting Tobacco: Care Instructions. \"  Current as of: November 8, 2021               Content Version: 13.4  © 2006-2022 Healthwise, Incorporated. Care instructions adapted under license by [employer] (which disclaims liability or warranty for this information). If you have questions about a medical condition or this instruction, always ask your healthcare professional. [address] any warranty or liability for your use of this information.

## 2022-12-12 NOTE — ASSESSMENT & PLAN NOTE
RECOMMENDATIONS given include: D/W patient the risks of Obesity long term and D/W patient diet and exercise for medically needed weight loss. D/W him options of medical/surgical therapy for future consideration. Encouraged average 1 to 1.5 pound weight loss per week with diet and activity changes. I encouraged keeping a written daily log of calorie intake with the eventual goal of planning meals. Plan health in between meal snacks into the diet and keep these on hand to avoid high calorie snacks. If craving food at innappropriate times (when one should not be hungry on the diet) try drinking a glass of water, exercise. Avoid eating while watching television or reading.

## 2022-12-13 LAB
ALBUMIN SERPL-MCNC: 3.8 G/DL (ref 3.5–5)
ALBUMIN/GLOB SERPL: 1 {RATIO} (ref 1.1–2.2)
ALP SERPL-CCNC: 81 U/L (ref 45–117)
ALT SERPL-CCNC: 31 U/L (ref 12–78)
ANION GAP SERPL CALC-SCNC: 4 MMOL/L (ref 5–15)
AST SERPL-CCNC: 40 U/L (ref 15–37)
BASOPHILS # BLD: 0 K/UL (ref 0–0.1)
BASOPHILS NFR BLD: 0 % (ref 0–1)
BILIRUB SERPL-MCNC: 0.6 MG/DL (ref 0.2–1)
BUN SERPL-MCNC: 11 MG/DL (ref 6–20)
BUN/CREAT SERPL: 12 (ref 12–20)
CALCIUM SERPL-MCNC: 9 MG/DL (ref 8.5–10.1)
CHLORIDE SERPL-SCNC: 102 MMOL/L (ref 97–108)
CHOLEST SERPL-MCNC: 131 MG/DL
CO2 SERPL-SCNC: 31 MMOL/L (ref 21–32)
CREAT SERPL-MCNC: 0.91 MG/DL (ref 0.7–1.3)
DIFFERENTIAL METHOD BLD: ABNORMAL
EOSINOPHIL # BLD: 0.1 K/UL (ref 0–0.4)
EOSINOPHIL NFR BLD: 2 % (ref 0–7)
ERYTHROCYTE [DISTWIDTH] IN BLOOD BY AUTOMATED COUNT: 16 % (ref 11.5–14.5)
FOLATE SERPL-MCNC: 12.8 NG/ML (ref 5–21)
GLOBULIN SER CALC-MCNC: 4 G/DL (ref 2–4)
GLUCOSE SERPL-MCNC: 178 MG/DL (ref 65–100)
HCT VFR BLD AUTO: 41.8 % (ref 36.6–50.3)
HDLC SERPL-MCNC: 45 MG/DL
HDLC SERPL: 2.9 {RATIO} (ref 0–5)
HGB BLD-MCNC: 12.6 G/DL (ref 12.1–17)
IMM GRANULOCYTES # BLD AUTO: 0 K/UL (ref 0–0.04)
IMM GRANULOCYTES NFR BLD AUTO: 0 % (ref 0–0.5)
LDLC SERPL CALC-MCNC: 64 MG/DL (ref 0–100)
LYMPHOCYTES # BLD: 1.8 K/UL (ref 0.8–3.5)
LYMPHOCYTES NFR BLD: 22 % (ref 12–49)
MCH RBC QN AUTO: 25.9 PG (ref 26–34)
MCHC RBC AUTO-ENTMCNC: 30.1 G/DL (ref 30–36.5)
MCV RBC AUTO: 85.8 FL (ref 80–99)
MONOCYTES # BLD: 0.5 K/UL (ref 0–1)
MONOCYTES NFR BLD: 7 % (ref 5–13)
NEUTS SEG # BLD: 5.5 K/UL (ref 1.8–8)
NEUTS SEG NFR BLD: 69 % (ref 32–75)
NRBC # BLD: 0 K/UL (ref 0–0.01)
NRBC BLD-RTO: 0 PER 100 WBC
PLATELET # BLD AUTO: 251 K/UL (ref 150–400)
PMV BLD AUTO: 12.3 FL (ref 8.9–12.9)
POTASSIUM SERPL-SCNC: 3.9 MMOL/L (ref 3.5–5.1)
PROT SERPL-MCNC: 7.8 G/DL (ref 6.4–8.2)
RBC # BLD AUTO: 4.87 M/UL (ref 4.1–5.7)
SODIUM SERPL-SCNC: 137 MMOL/L (ref 136–145)
TRIGL SERPL-MCNC: 110 MG/DL (ref ?–150)
VIT B12 SERPL-MCNC: 254 PG/ML (ref 193–986)
VLDLC SERPL CALC-MCNC: 22 MG/DL
WBC # BLD AUTO: 7.9 K/UL (ref 4.1–11.1)

## 2023-04-28 DIAGNOSIS — I10 BENIGN HYPERTENSION: ICD-10-CM

## 2023-04-28 RX ORDER — METOPROLOL TARTRATE 100 MG/1
TABLET ORAL
Qty: 180 TABLET | Refills: 0 | Status: SHIPPED | OUTPATIENT
Start: 2023-04-28

## 2023-06-09 RX ORDER — ATORVASTATIN CALCIUM 40 MG/1
TABLET, FILM COATED ORAL
Qty: 90 TABLET | Refills: 0 | Status: SHIPPED | OUTPATIENT
Start: 2023-06-09

## 2023-06-12 PROBLEM — K76.0 FATTY (CHANGE OF) LIVER, NOT ELSEWHERE CLASSIFIED: Status: ACTIVE | Noted: 2022-04-11

## 2023-06-12 PROBLEM — R74.8 ABNORMAL LEVELS OF OTHER SERUM ENZYMES: Status: ACTIVE | Noted: 2022-02-28

## 2023-09-07 ENCOUNTER — OFFICE VISIT (OUTPATIENT)
Facility: CLINIC | Age: 43
End: 2023-09-07
Payer: MEDICAID

## 2023-09-07 VITALS
OXYGEN SATURATION: 98 % | RESPIRATION RATE: 20 BRPM | HEART RATE: 66 BPM | SYSTOLIC BLOOD PRESSURE: 121 MMHG | DIASTOLIC BLOOD PRESSURE: 78 MMHG | TEMPERATURE: 97.6 F

## 2023-09-07 DIAGNOSIS — Z23 ENCOUNTER FOR IMMUNIZATION: Primary | ICD-10-CM

## 2023-09-07 PROCEDURE — 90471 IMMUNIZATION ADMIN: CPT | Performed by: FAMILY MEDICINE

## 2023-09-07 PROCEDURE — 90674 CCIIV4 VAC NO PRSV 0.5 ML IM: CPT | Performed by: FAMILY MEDICINE

## 2023-12-09 SDOH — ECONOMIC STABILITY: FOOD INSECURITY: WITHIN THE PAST 12 MONTHS, YOU WORRIED THAT YOUR FOOD WOULD RUN OUT BEFORE YOU GOT MONEY TO BUY MORE.: NEVER TRUE

## 2023-12-09 SDOH — ECONOMIC STABILITY: TRANSPORTATION INSECURITY
IN THE PAST 12 MONTHS, HAS LACK OF TRANSPORTATION KEPT YOU FROM MEETINGS, WORK, OR FROM GETTING THINGS NEEDED FOR DAILY LIVING?: NO

## 2023-12-09 SDOH — ECONOMIC STABILITY: FOOD INSECURITY: WITHIN THE PAST 12 MONTHS, THE FOOD YOU BOUGHT JUST DIDN'T LAST AND YOU DIDN'T HAVE MONEY TO GET MORE.: NEVER TRUE

## 2023-12-09 SDOH — ECONOMIC STABILITY: HOUSING INSECURITY
IN THE LAST 12 MONTHS, WAS THERE A TIME WHEN YOU DID NOT HAVE A STEADY PLACE TO SLEEP OR SLEPT IN A SHELTER (INCLUDING NOW)?: NO

## 2023-12-09 SDOH — ECONOMIC STABILITY: INCOME INSECURITY: HOW HARD IS IT FOR YOU TO PAY FOR THE VERY BASICS LIKE FOOD, HOUSING, MEDICAL CARE, AND HEATING?: NOT VERY HARD

## 2023-12-12 ENCOUNTER — OFFICE VISIT (OUTPATIENT)
Facility: CLINIC | Age: 43
End: 2023-12-12

## 2023-12-12 VITALS
RESPIRATION RATE: 22 BRPM | WEIGHT: 315 LBS | HEART RATE: 73 BPM | HEIGHT: 74 IN | DIASTOLIC BLOOD PRESSURE: 92 MMHG | TEMPERATURE: 97.8 F | OXYGEN SATURATION: 97 % | SYSTOLIC BLOOD PRESSURE: 144 MMHG | BODY MASS INDEX: 40.43 KG/M2

## 2023-12-12 DIAGNOSIS — E11.9 TYPE 2 DIABETES MELLITUS WITHOUT COMPLICATION, WITHOUT LONG-TERM CURRENT USE OF INSULIN (HCC): Primary | ICD-10-CM

## 2023-12-12 DIAGNOSIS — K74.00 LIVER FIBROSIS: ICD-10-CM

## 2023-12-12 DIAGNOSIS — Z79.899 LONG TERM USE OF DRUG: ICD-10-CM

## 2023-12-12 DIAGNOSIS — R63.5 WEIGHT GAIN: ICD-10-CM

## 2023-12-12 DIAGNOSIS — I10 BENIGN HYPERTENSION: ICD-10-CM

## 2023-12-12 DIAGNOSIS — E66.01 MORBID OBESITY (HCC): ICD-10-CM

## 2023-12-12 DIAGNOSIS — K76.0 FATTY (CHANGE OF) LIVER, NOT ELSEWHERE CLASSIFIED: ICD-10-CM

## 2023-12-12 DIAGNOSIS — F10.10 ALCOHOL ABUSE: ICD-10-CM

## 2023-12-12 PROBLEM — R74.8 ELEVATED LIVER ENZYMES: Status: ACTIVE | Noted: 2022-02-28

## 2023-12-12 LAB — HBA1C MFR BLD: 7.7 %

## 2023-12-12 PROCEDURE — 3079F DIAST BP 80-89 MM HG: CPT | Performed by: FAMILY MEDICINE

## 2023-12-12 PROCEDURE — 3077F SYST BP >= 140 MM HG: CPT | Performed by: FAMILY MEDICINE

## 2023-12-12 PROCEDURE — 83036 HEMOGLOBIN GLYCOSYLATED A1C: CPT | Performed by: FAMILY MEDICINE

## 2023-12-12 PROCEDURE — 99214 OFFICE O/P EST MOD 30 MIN: CPT | Performed by: FAMILY MEDICINE

## 2023-12-12 RX ORDER — HYDRALAZINE HYDROCHLORIDE 100 MG/1
100 TABLET, FILM COATED ORAL 3 TIMES DAILY
Qty: 90 TABLET | Refills: 5 | Status: SHIPPED | OUTPATIENT
Start: 2023-12-12

## 2023-12-12 RX ORDER — NALTREXONE HYDROCHLORIDE 50 MG/1
50 TABLET, FILM COATED ORAL DAILY
Qty: 30 TABLET | Refills: 5 | Status: SHIPPED | OUTPATIENT
Start: 2023-12-12

## 2023-12-12 RX ORDER — PNEUMOCOCCAL 20-VALENT CONJUGATE VACCINE 2.2; 2.2; 2.2; 2.2; 2.2; 2.2; 2.2; 2.2; 2.2; 2.2; 2.2; 2.2; 2.2; 2.2; 2.2; 2.2; 4.4; 2.2; 2.2; 2.2 UG/.5ML; UG/.5ML; UG/.5ML; UG/.5ML; UG/.5ML; UG/.5ML; UG/.5ML; UG/.5ML; UG/.5ML; UG/.5ML; UG/.5ML; UG/.5ML; UG/.5ML; UG/.5ML; UG/.5ML; UG/.5ML; UG/.5ML; UG/.5ML; UG/.5ML; UG/.5ML
0.5 INJECTION, SUSPENSION INTRAMUSCULAR ONCE
Qty: 1 EACH | Refills: 0 | Status: SHIPPED | OUTPATIENT
Start: 2023-12-12 | End: 2023-12-12

## 2023-12-12 ASSESSMENT — ENCOUNTER SYMPTOMS
SHORTNESS OF BREATH: 0
COUGH: 0

## 2023-12-12 NOTE — PATIENT INSTRUCTIONS
Get prevnar 20 vaccine at your local pharmacy. Increase hydralazine and metformin. RECOMMENDATIONS given include: D/W patient the risks of Obesity long term and D/W patient diet and exercise for medically needed weight loss. Encouraged average 1 to 1.5 pound weight loss per week with diet and activity changes. I encouraged keeping a written daily log of calorie intake with the eventual goal of planning meals. Plan health in between meal snacks into the diet and keep these on hand to avoid high calorie snacks. If craving food at innappropriate times (when one should not be hungry on the diet) try drinking a glass of water, exercise. Avoid eating while watching television or reading.     [address] [facility] [place]  [address] [facility] [address] [facility]   [phone]

## 2023-12-12 NOTE — PROGRESS NOTES
[facility]  Chief Complaint   Patient presents with    Follow-up Chronic Condition       History of Present Illness:   Geovanny Shaffer is a 39 y.o. male       HPI:  Diabetes Mellitus:  He has diabetes mellitus, and  hypertension and hyperlipidemia. Diabetic ROS - medication compliance: compliant most of the time, still drinking etoh occ. Not checking fsbs regularly. Last A1c 7.6.  Up 14 lbs since 6/23. BP was good at dentist recently. H/o fatty liver, saw GI in past but not recently. Hemoglobin A1C, POC   Date Value Ref Range Status   12/12/2023 7.7 % Final         Health Maintenance  Health Maintenance Due   Topic Date Due    Pneumococcal 0-64 years Vaccine (2 - PCV) 03/09/2023    Diabetic retinal exam  06/10/2023    COVID-19 Vaccine (4 - 2023-24 season) 09/01/2023       Past Medical, Family, and Social History:     Past Medical History:   Diagnosis Date    Benign hypertension 2/28/2022    Elevated liver enzymes 2/28/2022    CT showed severe hepatic steatosis with developing surface nodularity to the liver, suspect early cirrhosis Referred to hepatology    Mild intermittent asthma without complication 7/9/4882    Mixed hyperlipidemia 2/28/2022    Morbid obesity ([address]) 2/28/2022    Obstructive sleep apnea syndrome 2/28/2022      History reviewed. No pertinent surgical history.     Current Outpatient Medications on File Prior to Visit   Medication Sig Dispense Refill    atorvastatin (LIPITOR) 40 MG tablet Take 1 tablet by mouth daily 90 tablet 3    amLODIPine (NORVASC) 10 MG tablet Take 1 tablet by mouth daily 90 tablet 3    hydroCHLOROthiazide (HYDRODIURIL) 25 MG tablet Take 1 tablet by mouth daily 90 tablet 3    metoprolol (LOPRESSOR) 100 MG tablet Take 1 tablet by mouth 2 times daily 180 tablet 3    colchicine (COLCRYS) 0.6 MG tablet Take 2 tablets p.o. now and then repeat 1 tablet in 1 hour, do not repeat for 3 days (Patient not taking: Reported on 12/12/2023)       No current

## 2023-12-13 LAB
ALBUMIN SERPL-MCNC: 3.8 G/DL (ref 3.5–5)
ALBUMIN/GLOB SERPL: 0.8 (ref 1.1–2.2)
ALP SERPL-CCNC: 87 U/L (ref 45–117)
ALT SERPL-CCNC: 28 U/L (ref 12–78)
ANION GAP SERPL CALC-SCNC: 5 MMOL/L (ref 5–15)
AST SERPL-CCNC: 41 U/L (ref 15–37)
BASOPHILS # BLD: 0 K/UL (ref 0–0.1)
BASOPHILS NFR BLD: 1 % (ref 0–1)
BILIRUB SERPL-MCNC: 0.7 MG/DL (ref 0.2–1)
BUN SERPL-MCNC: 10 MG/DL (ref 6–20)
BUN/CREAT SERPL: 11 (ref 12–20)
CALCIUM SERPL-MCNC: 9 MG/DL (ref 8.5–10.1)
CHLORIDE SERPL-SCNC: 102 MMOL/L (ref 97–108)
CO2 SERPL-SCNC: 30 MMOL/L (ref 21–32)
CREAT SERPL-MCNC: 0.92 MG/DL (ref 0.7–1.3)
DIFFERENTIAL METHOD BLD: ABNORMAL
EOSINOPHIL # BLD: 0.2 K/UL (ref 0–0.4)
EOSINOPHIL NFR BLD: 2 % (ref 0–7)
ERYTHROCYTE [DISTWIDTH] IN BLOOD BY AUTOMATED COUNT: 15.7 % (ref 11.5–14.5)
GLOBULIN SER CALC-MCNC: 4.6 G/DL (ref 2–4)
GLUCOSE SERPL-MCNC: 167 MG/DL (ref 65–100)
HCT VFR BLD AUTO: 40.8 % (ref 36.6–50.3)
HGB BLD-MCNC: 12.6 G/DL (ref 12.1–17)
IMM GRANULOCYTES # BLD AUTO: 0.1 K/UL (ref 0–0.04)
IMM GRANULOCYTES NFR BLD AUTO: 1 % (ref 0–0.5)
LYMPHOCYTES # BLD: 1.7 K/UL (ref 0.8–3.5)
LYMPHOCYTES NFR BLD: 22 % (ref 12–49)
MCH RBC QN AUTO: 25.9 PG (ref 26–34)
MCHC RBC AUTO-ENTMCNC: 30.9 G/DL (ref 30–36.5)
MCV RBC AUTO: 83.8 FL (ref 80–99)
MONOCYTES # BLD: 0.6 K/UL (ref 0–1)
MONOCYTES NFR BLD: 8 % (ref 5–13)
NEUTS SEG # BLD: 5 K/UL (ref 1.8–8)
NEUTS SEG NFR BLD: 66 % (ref 32–75)
NRBC # BLD: 0 K/UL (ref 0–0.01)
NRBC BLD-RTO: 0 PER 100 WBC
PLATELET # BLD AUTO: 240 K/UL (ref 150–400)
PMV BLD AUTO: 12.8 FL (ref 8.9–12.9)
POTASSIUM SERPL-SCNC: 4.1 MMOL/L (ref 3.5–5.1)
PROT SERPL-MCNC: 8.4 G/DL (ref 6.4–8.2)
RBC # BLD AUTO: 4.87 M/UL (ref 4.1–5.7)
SODIUM SERPL-SCNC: 137 MMOL/L (ref 136–145)
TSH SERPL DL<=0.05 MIU/L-ACNC: 4.77 UIU/ML (ref 0.36–3.74)
WBC # BLD AUTO: 7.6 K/UL (ref 4.1–11.1)

## 2024-02-07 ENCOUNTER — OFFICE VISIT (OUTPATIENT)
Facility: CLINIC | Age: 44
End: 2024-02-07
Payer: COMMERCIAL

## 2024-02-07 VITALS
DIASTOLIC BLOOD PRESSURE: 73 MMHG | BODY MASS INDEX: 40.43 KG/M2 | TEMPERATURE: 98.3 F | SYSTOLIC BLOOD PRESSURE: 118 MMHG | RESPIRATION RATE: 20 BRPM | WEIGHT: 315 LBS | OXYGEN SATURATION: 97 % | HEART RATE: 74 BPM | HEIGHT: 74 IN

## 2024-02-07 DIAGNOSIS — F10.10 ALCOHOL ABUSE: ICD-10-CM

## 2024-02-07 DIAGNOSIS — E11.9 TYPE 2 DIABETES MELLITUS WITHOUT COMPLICATION, WITHOUT LONG-TERM CURRENT USE OF INSULIN (HCC): ICD-10-CM

## 2024-02-07 DIAGNOSIS — I10 BENIGN HYPERTENSION: Primary | ICD-10-CM

## 2024-02-07 PROCEDURE — 99214 OFFICE O/P EST MOD 30 MIN: CPT | Performed by: FAMILY MEDICINE

## 2024-02-07 PROCEDURE — 3078F DIAST BP <80 MM HG: CPT | Performed by: FAMILY MEDICINE

## 2024-02-07 PROCEDURE — 3074F SYST BP LT 130 MM HG: CPT | Performed by: FAMILY MEDICINE

## 2024-02-07 RX ORDER — PNEUMOCOCCAL 20-VALENT CONJUGATE VACCINE 2.2; 2.2; 2.2; 2.2; 2.2; 2.2; 2.2; 2.2; 2.2; 2.2; 2.2; 2.2; 2.2; 2.2; 2.2; 2.2; 4.4; 2.2; 2.2; 2.2 UG/.5ML; UG/.5ML; UG/.5ML; UG/.5ML; UG/.5ML; UG/.5ML; UG/.5ML; UG/.5ML; UG/.5ML; UG/.5ML; UG/.5ML; UG/.5ML; UG/.5ML; UG/.5ML; UG/.5ML; UG/.5ML; UG/.5ML; UG/.5ML; UG/.5ML; UG/.5ML
0.5 INJECTION, SUSPENSION INTRAMUSCULAR ONCE
Qty: 1 EACH | Refills: 0 | Status: SHIPPED | OUTPATIENT
Start: 2024-02-07 | End: 2024-02-07

## 2024-02-07 RX ORDER — HYDRALAZINE HYDROCHLORIDE 100 MG/1
100 TABLET, FILM COATED ORAL 3 TIMES DAILY
Qty: 360 TABLET | Refills: 1 | Status: SHIPPED | OUTPATIENT
Start: 2024-02-07

## 2024-02-07 ASSESSMENT — PATIENT HEALTH QUESTIONNAIRE - PHQ9
SUM OF ALL RESPONSES TO PHQ QUESTIONS 1-9: 0
SUM OF ALL RESPONSES TO PHQ QUESTIONS 1-9: 0
SUM OF ALL RESPONSES TO PHQ9 QUESTIONS 1 & 2: 0
2. FEELING DOWN, DEPRESSED OR HOPELESS: 0
1. LITTLE INTEREST OR PLEASURE IN DOING THINGS: 0
SUM OF ALL RESPONSES TO PHQ QUESTIONS 1-9: 0
SUM OF ALL RESPONSES TO PHQ QUESTIONS 1-9: 0

## 2024-02-07 ASSESSMENT — ENCOUNTER SYMPTOMS
SHORTNESS OF BREATH: 0
COUGH: 0

## 2024-02-07 NOTE — PROGRESS NOTES
[facility]  Chief Complaint   Patient presents with    Hypertension       History of Present Illness:   Jordi White Jr. is a 43 y.o. male       HPI:  Here for f/u htn, diabetes. I increased hydralazine and metformin 12/23. Fsbs 120s-130s.   Up 14 lbs since 6/23. Weight stable today.     H/o fatty liver, saw GI in past but not recently. Has appt 5/24.  Did not start naltrexone. He quit drinking before the holidays but has started back again. He has appt with hepatology in 5/24.   Had eye exam.     Hemoglobin A1C, POC   Date Value Ref Range Status   12/12/2023 7.7 % Final     TSH slightly off 4.7. Asx.    Health Maintenance  Health Maintenance Due   Topic Date Due    Pneumococcal 0-64 years Vaccine (2 - PCV) 03/09/2023    Diabetic retinal exam  06/10/2023       Past Medical, Family, and Social History:     Past Medical History:   Diagnosis Date    Benign hypertension 2/28/2022    Elevated liver enzymes 2/28/2022    CT showed severe hepatic steatosis with developing surface nodularity to the liver, suspect early cirrhosis Referred to hepatology    Mild intermittent asthma without complication 3/3/2022    Mixed hyperlipidemia 2/28/2022    Morbid obesity ([facility]) 2/28/2022    Obstructive sleep apnea syndrome 2/28/2022      History reviewed. No pertinent surgical history.    Current Outpatient Medications on File Prior to Visit   Medication Sig Dispense Refill    atorvastatin (LIPITOR) 40 MG tablet Take 1 tablet by mouth daily 90 tablet 3    amLODIPine (NORVASC) 10 MG tablet Take 1 tablet by mouth daily 90 tablet 3    hydroCHLOROthiazide (HYDRODIURIL) 25 MG tablet Take 1 tablet by mouth daily 90 tablet 3    metoprolol (LOPRESSOR) 100 MG tablet Take 1 tablet by mouth 2 times daily 180 tablet 3    colchicine (COLCRYS) 0.6 MG tablet As needed      naltrexone (DEPADE) 50 MG tablet Take 1 tablet by mouth daily (Patient not taking: Reported on 2/7/2024) 30 tablet 5     No current facility-administered

## 2024-02-07 NOTE — PROGRESS NOTES
1. \"Have you been to the ER, urgent care clinic since your last visit?  Hospitalized since your last visit?\" NO    2. \"Have you seen or consulted any other health care providers outside of the [facility] since your last visit?\" no        Health Maintenance Due   Topic Date Due    Pneumococcal 0-64 years Vaccine (2 - PCV) 03/09/2023    Diabetic retinal exam  06/10/2023   Eye - [facility]

## 2024-02-22 ENCOUNTER — PATIENT MESSAGE (OUTPATIENT)
Facility: CLINIC | Age: 44
End: 2024-02-22

## 2024-02-23 RX ORDER — ALBUTEROL SULFATE 90 UG/1
2 AEROSOL, METERED RESPIRATORY (INHALATION) EVERY 4 HOURS PRN
Qty: 18 G | Refills: 5 | Status: SHIPPED | OUTPATIENT
Start: 2024-02-23

## 2024-02-23 RX ORDER — ALBUTEROL SULFATE 2.5 MG/3ML
2.5 SOLUTION RESPIRATORY (INHALATION) EVERY 4 HOURS PRN
Qty: 120 EACH | Refills: 3 | Status: SHIPPED | OUTPATIENT
Start: 2024-02-23

## 2024-02-23 NOTE — TELEPHONE ENCOUNTER
From: Jordi White Jr.  To: Dr. Hien Mehta  Sent: 2024 6:33 PM EST  Subject: Asthma Inhalers    I have asthma inhalers but I rarely use them but I checked the one I do have and it  in  and I don’t know how old my nebulizer solutions are. My inhaler was Albuteral Sulfate 90mcg. Thanks.

## 2024-05-22 ENCOUNTER — OFFICE VISIT (OUTPATIENT)
Age: 44
End: 2024-05-22
Payer: COMMERCIAL

## 2024-05-22 VITALS
WEIGHT: 315 LBS | HEART RATE: 80 BPM | DIASTOLIC BLOOD PRESSURE: 76 MMHG | BODY MASS INDEX: 40.43 KG/M2 | TEMPERATURE: 97.8 F | OXYGEN SATURATION: 94 % | HEIGHT: 74 IN | SYSTOLIC BLOOD PRESSURE: 116 MMHG

## 2024-05-22 DIAGNOSIS — K76.0 FATTY (CHANGE OF) LIVER, NOT ELSEWHERE CLASSIFIED: Primary | ICD-10-CM

## 2024-05-22 DIAGNOSIS — Z78.9 ALCOHOL USE: ICD-10-CM

## 2024-05-22 DIAGNOSIS — R74.8 ELEVATED LIVER ENZYMES: ICD-10-CM

## 2024-05-22 PROCEDURE — 3078F DIAST BP <80 MM HG: CPT | Performed by: NURSE PRACTITIONER

## 2024-05-22 PROCEDURE — 99204 OFFICE O/P NEW MOD 45 MIN: CPT | Performed by: NURSE PRACTITIONER

## 2024-05-22 PROCEDURE — 3074F SYST BP LT 130 MM HG: CPT | Performed by: NURSE PRACTITIONER

## 2024-05-22 ASSESSMENT — PATIENT HEALTH QUESTIONNAIRE - PHQ9
SUM OF ALL RESPONSES TO PHQ9 QUESTIONS 1 & 2: 0
2. FEELING DOWN, DEPRESSED OR HOPELESS: NOT AT ALL
SUM OF ALL RESPONSES TO PHQ QUESTIONS 1-9: 0
DEPRESSION UNABLE TO ASSESS: FUNCTIONAL CAPACITY MOTIVATION LIMITS ACCURACY
SUM OF ALL RESPONSES TO PHQ QUESTIONS 1-9: 0
SUM OF ALL RESPONSES TO PHQ QUESTIONS 1-9: 0
1. LITTLE INTEREST OR PLEASURE IN DOING THINGS: NOT AT ALL
SUM OF ALL RESPONSES TO PHQ QUESTIONS 1-9: 0

## 2024-05-22 NOTE — PROGRESS NOTES
Identified pt with two pt identifiers(name and ). Reviewed record in preparation for visit and have obtained necessary documentation.  Vitals:    24 1407 24 1412   BP: (!) 142/85 116/76   Site: Left Upper Arm Left Upper Arm   Position: Sitting Sitting   Cuff Size: Large Adult Large Adult   Pulse: 80    Temp: 97.8 °F (36.6 °C)    TempSrc: Temporal    SpO2: 94%    Weight: (!) 195.8 kg (431 lb 9.6 oz)    Height: 1.88 m (6' 2\")         Health Maintenance Review: Patient reminded of \"due or due soon\" health maintenance. I have asked the patient to contact his/her primary care provider (PCP) for follow-up on his/her health maintenance.    Coordination of Care Questionnaire:  :   1) Have you been to an emergency room, urgent care, or hospitalized since your last visit?  If yes, where when, and reason for visit? no       2. Have seen or consulted any other health care provider since your last visit?   If yes, where when, and reason for visit?  No      Patient is accompanied by self I have received verbal consent from Jordi White Jr. to discuss any/all medical information while they are present in the room.   
liver fibrosis and hepatic steatosis.    Will perform laboratory testing to monitor liver function and degree of liver injury.    This included BMP, hepatic panel, CBC with platelet count, INR.      From labs performed in 12/2023, AST is elevated.  ALT is normal.  ALP is normal.  Liver function is normal.  The platelet count is normal.    Based upon laboratory studies and imaging  the patient may have significant liver injury but it is not clear if the patient also has underlying cirrhosis.    Serologic testing for causes of chronic liver disease was negative for HCV, HBV, Alpha-1-antitrypsin and Ceruloplasmin.  Will perform additional serologic tests to screen for other causes of chronic liver disease.    Will perform imaging of the liver with ultrasound.      If the patient looses 20% of current body weight, which is 86 pounds, down to a weight of of 363 pounds, all steatosis will have resolved.    Once all steatosis has resolved all inflammation will resolve.  Then all fibrosis will gradually resolve and the liver could eventually be normal.    If the elevation in liver enzymes is due to Metabolic Associated Steatotic Liver Disease (MASLD), steatosis will resolve and the liver enzymes will decline to normal with weight loss and alcohol elimination.    Counseling for diet and weight loss in patients with confirmed or suspected Metabolic Associated Steatotic Liver Disease (MASLD)  The patient was counseled regarding diet and exercise to achieve weight loss.  The best diet for patients with fatty liver is one very low in carbohydrates and enriched with protein such as an Robyn's program.      The patient was told not to consume any food products and drinks containing fructose as this enhances hepatic fat synthesis.    There are no medications that have been FDA approved specifically to treat MASLD.      There is no medication or vitamin supplements that we specifically advocate for MASLD.      Using glitazones in

## 2024-05-23 LAB
ALBUMIN SERPL-MCNC: 3.9 G/DL (ref 3.5–5)
ALBUMIN/GLOB SERPL: 0.9 (ref 1.1–2.2)
ALP SERPL-CCNC: 71 U/L (ref 45–117)
ALT SERPL-CCNC: 28 U/L (ref 12–78)
ANION GAP SERPL CALC-SCNC: 7 MMOL/L (ref 5–15)
AST SERPL-CCNC: 36 U/L (ref 15–37)
BASOPHILS # BLD: 0.1 K/UL (ref 0–0.1)
BASOPHILS NFR BLD: 1 % (ref 0–1)
BILIRUB DIRECT SERPL-MCNC: 0.2 MG/DL (ref 0–0.2)
BILIRUB SERPL-MCNC: 0.8 MG/DL (ref 0.2–1)
BUN SERPL-MCNC: 14 MG/DL (ref 6–20)
BUN/CREAT SERPL: 15 (ref 12–20)
CALCIUM SERPL-MCNC: 9.7 MG/DL (ref 8.5–10.1)
CHLORIDE SERPL-SCNC: 104 MMOL/L (ref 97–108)
CO2 SERPL-SCNC: 27 MMOL/L (ref 21–32)
CREAT SERPL-MCNC: 0.93 MG/DL (ref 0.7–1.3)
DIFFERENTIAL METHOD BLD: ABNORMAL
EOSINOPHIL # BLD: 0.1 K/UL (ref 0–0.4)
EOSINOPHIL NFR BLD: 2 % (ref 0–7)
ERYTHROCYTE [DISTWIDTH] IN BLOOD BY AUTOMATED COUNT: 16 % (ref 11.5–14.5)
FERRITIN SERPL-MCNC: 88 NG/ML (ref 26–388)
GLOBULIN SER CALC-MCNC: 4.5 G/DL (ref 2–4)
GLUCOSE SERPL-MCNC: 134 MG/DL (ref 65–100)
HBV SURFACE AB SER QL: NONREACTIVE
HBV SURFACE AB SER-ACNC: 3.34 MIU/ML
HCT VFR BLD AUTO: 39.6 % (ref 36.6–50.3)
HGB BLD-MCNC: 12.8 G/DL (ref 12.1–17)
IMM GRANULOCYTES # BLD AUTO: 0 K/UL (ref 0–0.04)
IMM GRANULOCYTES NFR BLD AUTO: 0 % (ref 0–0.5)
INR PPP: 1.2 (ref 0.9–1.1)
IRON SATN MFR SERPL: 15 % (ref 20–50)
IRON SERPL-MCNC: 53 UG/DL (ref 35–150)
LYMPHOCYTES # BLD: 2.5 K/UL (ref 0.8–3.5)
LYMPHOCYTES NFR BLD: 28 % (ref 12–49)
MCH RBC QN AUTO: 25.5 PG (ref 26–34)
MCHC RBC AUTO-ENTMCNC: 32.3 G/DL (ref 30–36.5)
MCV RBC AUTO: 78.9 FL (ref 80–99)
MONOCYTES # BLD: 0.6 K/UL (ref 0–1)
MONOCYTES NFR BLD: 6 % (ref 5–13)
NEUTS SEG # BLD: 5.7 K/UL (ref 1.8–8)
NEUTS SEG NFR BLD: 63 % (ref 32–75)
NRBC # BLD: 0 K/UL (ref 0–0.01)
NRBC BLD-RTO: 0 PER 100 WBC
PLATELET # BLD AUTO: 296 K/UL (ref 150–400)
PMV BLD AUTO: 12.3 FL (ref 8.9–12.9)
POTASSIUM SERPL-SCNC: 3.9 MMOL/L (ref 3.5–5.1)
PROT SERPL-MCNC: 8.4 G/DL (ref 6.4–8.2)
PROTHROMBIN TIME: 12.8 SEC (ref 9–11.1)
RBC # BLD AUTO: 5.02 M/UL (ref 4.1–5.7)
SODIUM SERPL-SCNC: 138 MMOL/L (ref 136–145)
TIBC SERPL-MCNC: 354 UG/DL (ref 250–450)
WBC # BLD AUTO: 9 K/UL (ref 4.1–11.1)

## 2024-05-24 LAB
AFP L3 MFR SERPL: 10.3 % (ref 0–9.9)
AFP SERPL-MCNC: 3.7 NG/ML (ref 0–6.9)
HAV AB SER QL IA: POSITIVE

## 2024-05-25 LAB
A2 MACROGLOB SERPL-MCNC: 237 MG/DL (ref 110–276)
ALT SERPL-CCNC: 26 IU/L (ref 0–55)
APO A-I SERPL-MCNC: 107 MG/DL (ref 101–178)
AST SERPL W P-5'-P-CCNC: 39 IU/L (ref 0–40)
BILIRUB SERPL-MCNC: 0.4 MG/DL (ref 0–1.2)
CHOLEST SERPL-MCNC: 122 MG/DL (ref 100–199)
FIBROSIS SCORING:: ABNORMAL
FIBROSIS STAGE SERPL QL: ABNORMAL
GGT SERPL-CCNC: 190 IU/L (ref 0–65)
GLUCOSE SERPL-MCNC: 132 MG/DL (ref 70–99)
HAPTOGLOB SERPL-MCNC: 349 MG/DL (ref 23–355)
INTERPRETATION: ABNORMAL
LABORATORY COMMENT REPORT: ABNORMAL
LIVER FIBR SCORE SERPL CALC.FIBROSURE: 0.37 (ref 0–0.21)
Lab: ABNORMAL
NASH - STEATOSIS GRADE: ABNORMAL
NASH - STEATOSIS GRADING: ABNORMAL
NASH - STEATOSIS SCORE: 0.65 (ref 0–0.4)
NASH SCORING: ABNORMAL
NECROINFLAMMATORY ACT GRADE SERPL QL: ABNORMAL
NECROINFLAMMATORY ACT SCORE SERPL: 0.62 (ref 0–0.25)
SERVICE CMNT-IMP: ABNORMAL
TRIGL SERPL-MCNC: 97 MG/DL (ref 0–149)

## 2024-06-06 ENCOUNTER — OFFICE VISIT (OUTPATIENT)
Facility: CLINIC | Age: 44
End: 2024-06-06
Payer: COMMERCIAL

## 2024-06-06 VITALS
RESPIRATION RATE: 16 BRPM | DIASTOLIC BLOOD PRESSURE: 66 MMHG | WEIGHT: 315 LBS | SYSTOLIC BLOOD PRESSURE: 107 MMHG | HEIGHT: 74 IN | BODY MASS INDEX: 40.43 KG/M2 | OXYGEN SATURATION: 95 % | HEART RATE: 73 BPM | TEMPERATURE: 97.5 F

## 2024-06-06 DIAGNOSIS — R79.89 ABNORMAL THYROID BLOOD TEST: ICD-10-CM

## 2024-06-06 DIAGNOSIS — I10 BENIGN HYPERTENSION: ICD-10-CM

## 2024-06-06 DIAGNOSIS — E11.9 TYPE 2 DIABETES MELLITUS WITHOUT COMPLICATION, WITHOUT LONG-TERM CURRENT USE OF INSULIN (HCC): Primary | ICD-10-CM

## 2024-06-06 DIAGNOSIS — E78.2 MIXED HYPERLIPIDEMIA: ICD-10-CM

## 2024-06-06 PROCEDURE — 3074F SYST BP LT 130 MM HG: CPT | Performed by: FAMILY MEDICINE

## 2024-06-06 PROCEDURE — 3078F DIAST BP <80 MM HG: CPT | Performed by: FAMILY MEDICINE

## 2024-06-06 PROCEDURE — 99214 OFFICE O/P EST MOD 30 MIN: CPT | Performed by: FAMILY MEDICINE

## 2024-06-06 RX ORDER — HYDRALAZINE HYDROCHLORIDE 100 MG/1
100 TABLET, FILM COATED ORAL 3 TIMES DAILY
Qty: 360 TABLET | Refills: 1 | Status: CANCELLED | OUTPATIENT
Start: 2024-06-06

## 2024-06-06 RX ORDER — HYDRALAZINE HYDROCHLORIDE 100 MG/1
100 TABLET, FILM COATED ORAL 3 TIMES DAILY
Qty: 360 TABLET | Refills: 1 | Status: SHIPPED | OUTPATIENT
Start: 2024-06-06

## 2024-06-06 RX ORDER — ATORVASTATIN CALCIUM 40 MG/1
40 TABLET, FILM COATED ORAL DAILY
Qty: 90 TABLET | Refills: 3 | Status: SHIPPED | OUTPATIENT
Start: 2024-06-06

## 2024-06-06 RX ORDER — METOPROLOL TARTRATE 100 MG/1
100 TABLET ORAL 2 TIMES DAILY
Qty: 180 TABLET | Refills: 3 | Status: SHIPPED | OUTPATIENT
Start: 2024-06-06

## 2024-06-06 RX ORDER — METOPROLOL TARTRATE 100 MG/1
100 TABLET ORAL 2 TIMES DAILY
Qty: 180 TABLET | Refills: 3 | Status: CANCELLED | OUTPATIENT
Start: 2024-06-06

## 2024-06-06 RX ORDER — AMLODIPINE BESYLATE 10 MG/1
10 TABLET ORAL DAILY
Qty: 90 TABLET | Refills: 3 | Status: CANCELLED | OUTPATIENT
Start: 2024-06-06

## 2024-06-06 RX ORDER — HYDROCHLOROTHIAZIDE 25 MG/1
25 TABLET ORAL DAILY
Qty: 90 TABLET | Refills: 3 | Status: CANCELLED | OUTPATIENT
Start: 2024-06-06

## 2024-06-06 RX ORDER — ATORVASTATIN CALCIUM 40 MG/1
40 TABLET, FILM COATED ORAL DAILY
Qty: 90 TABLET | Refills: 3 | Status: CANCELLED | OUTPATIENT
Start: 2024-06-06

## 2024-06-06 RX ORDER — HYDROCHLOROTHIAZIDE 25 MG/1
25 TABLET ORAL DAILY
Qty: 90 TABLET | Refills: 3 | Status: SHIPPED | OUTPATIENT
Start: 2024-06-06

## 2024-06-06 RX ORDER — AMLODIPINE BESYLATE 10 MG/1
10 TABLET ORAL DAILY
Qty: 90 TABLET | Refills: 3 | Status: SHIPPED | OUTPATIENT
Start: 2024-06-06

## 2024-06-06 ASSESSMENT — ENCOUNTER SYMPTOMS
COUGH: 0
SHORTNESS OF BREATH: 0

## 2024-06-06 NOTE — PROGRESS NOTES
Chief Complaint   Patient presents with    Follow-up Chronic Condition     DM         \"Have you been to the ER, urgent care clinic since your last visit?  Hospitalized since your last visit?\"    NO    “Have you seen or consulted any other health care providers outside of [facility] since your last visit?”    [facility]             Click Here for Release of Records Request     Health Maintenance Due   Topic Date Due    Pneumococcal 0-64 years Vaccine (2 of 2 - PCV) 03/09/2023    Diabetic retinal exam  06/10/2023    Diabetic foot exam  06/12/2024    Diabetic Alb to Cr ratio (uACR) test  06/12/2024    Lipids  06/12/2024       
MG tablet; Take 1 tablet by mouth daily, Disp-90 tablet, R-3Normal  -     hydrALAZINE (APRESOLINE) 100 MG tablet; Take 1 tablet by mouth 3 times daily, Disp-360 tablet, R-1Normal  -     amLODIPine (NORVASC) 10 MG tablet; Take 1 tablet by mouth daily, Disp-90 tablet, R-3Normal  3. Mixed hyperlipidemia  -     Lipid Panel; Future  -     atorvastatin (LIPITOR) 40 MG tablet; Take 1 tablet by mouth daily, Disp-90 tablet, R-3Normal  4. Abnormal thyroid blood test  -     TSH; Future  Current treatment plan is effective, no change in therapy , lab results and schedule of future lab studies reviewed with patient, Very strongly urged to quit drinking, start naltrexone if cannot do it on his own. I reviewed medications and side effects in detail  Advised Hep B at health dept  Return in about 6 months (around 12/6/2024).   I have discussed the diagnosis with the patient and the intended plan as seen in the above orders.  Social history, medical history, and labs were reviewed.  The patient has received an after-visit summary and questions were answered concerning future plans.  I have discussed medication side effects and warnings with the patient as well. Patient verbalized understanding and accepts plan & risks.      Hien Mehta MD  [facility]  06/06/24

## 2024-06-07 ENCOUNTER — TELEPHONE (OUTPATIENT)
Facility: CLINIC | Age: 44
End: 2024-06-07

## 2024-06-07 DIAGNOSIS — E11.9 TYPE 2 DIABETES MELLITUS WITHOUT COMPLICATION, WITHOUT LONG-TERM CURRENT USE OF INSULIN (HCC): Primary | ICD-10-CM

## 2024-06-07 LAB
CHOLEST SERPL-MCNC: 110 MG/DL
EST. AVERAGE GLUCOSE BLD GHB EST-MCNC: 200 MG/DL
HBA1C MFR BLD: 8.6 % (ref 4–5.6)
HDLC SERPL-MCNC: 37 MG/DL
HDLC SERPL: 3 (ref 0–5)
LDLC SERPL CALC-MCNC: 55 MG/DL (ref 0–100)
TRIGL SERPL-MCNC: 90 MG/DL
TSH SERPL DL<=0.05 MIU/L-ACNC: 2.06 UIU/ML (ref 0.36–3.74)
VLDLC SERPL CALC-MCNC: 18 MG/DL

## 2024-06-07 NOTE — TELEPHONE ENCOUNTER
Spoke to patient. Advised of lab results per Dr. Mehta.  Patient would like to try Jardiance. [facility].

## 2024-06-10 ENCOUNTER — HOSPITAL ENCOUNTER (OUTPATIENT)
Facility: HOSPITAL | Age: 44
Discharge: HOME | End: 2024-06-13
Payer: COMMERCIAL

## 2024-06-10 DIAGNOSIS — K76.0 FATTY (CHANGE OF) LIVER, NOT ELSEWHERE CLASSIFIED: ICD-10-CM

## 2024-06-10 DIAGNOSIS — R74.8 ELEVATED LIVER ENZYMES: ICD-10-CM

## 2024-06-10 DIAGNOSIS — Z78.9 ALCOHOL USE: ICD-10-CM

## 2024-06-10 PROCEDURE — 76705 ECHO EXAM OF ABDOMEN: CPT

## 2024-06-11 DIAGNOSIS — E11.9 TYPE 2 DIABETES MELLITUS WITHOUT COMPLICATION, WITHOUT LONG-TERM CURRENT USE OF INSULIN (HCC): Primary | ICD-10-CM

## 2024-07-08 ENCOUNTER — PATIENT MESSAGE (OUTPATIENT)
Facility: CLINIC | Age: 44
End: 2024-07-08

## 2024-07-08 NOTE — TELEPHONE ENCOUNTER
From: Jordi White Jr.  To: Dr. Hien Mehta  Sent: 7/8/2024 12:01 PM EDT  Subject: Nba Lin, my father’s insurance has changed and [facility] does not cover bassaglar , so they want to change to lantus. Thanks

## 2024-09-10 ENCOUNTER — OFFICE VISIT (OUTPATIENT)
Facility: CLINIC | Age: 44
End: 2024-09-10
Payer: COMMERCIAL

## 2024-09-10 VITALS
OXYGEN SATURATION: 95 % | HEART RATE: 75 BPM | RESPIRATION RATE: 14 BRPM | DIASTOLIC BLOOD PRESSURE: 79 MMHG | SYSTOLIC BLOOD PRESSURE: 123 MMHG | TEMPERATURE: 97.2 F

## 2024-09-10 DIAGNOSIS — Z23 ENCOUNTER FOR IMMUNIZATION: Primary | ICD-10-CM

## 2024-09-10 PROCEDURE — 90661 CCIIV3 VAC ABX FR 0.5 ML IM: CPT | Performed by: FAMILY MEDICINE

## 2024-09-10 PROCEDURE — 90471 IMMUNIZATION ADMIN: CPT | Performed by: FAMILY MEDICINE

## 2024-12-03 ENCOUNTER — OFFICE VISIT (OUTPATIENT)
Age: 44
End: 2024-12-03
Payer: COMMERCIAL

## 2024-12-03 VITALS
SYSTOLIC BLOOD PRESSURE: 156 MMHG | HEART RATE: 81 BPM | HEIGHT: 74 IN | DIASTOLIC BLOOD PRESSURE: 73 MMHG | WEIGHT: 315 LBS | BODY MASS INDEX: 40.43 KG/M2 | TEMPERATURE: 98.5 F | OXYGEN SATURATION: 98 %

## 2024-12-03 DIAGNOSIS — E11.9 TYPE 2 DIABETES MELLITUS WITHOUT COMPLICATION, WITHOUT LONG-TERM CURRENT USE OF INSULIN (HCC): ICD-10-CM

## 2024-12-03 DIAGNOSIS — K75.81 METABOLIC DYSFUNCTION-ASSOCIATED STEATOHEPATITIS (MASH): Primary | ICD-10-CM

## 2024-12-03 LAB
BASOPHILS # BLD AUTO: 0 X10E3/UL (ref 0–0.2)
BASOPHILS NFR BLD AUTO: 1 %
EOSINOPHIL # BLD AUTO: 0.1 X10E3/UL (ref 0–0.4)
EOSINOPHIL NFR BLD AUTO: 1 %
ERYTHROCYTE [DISTWIDTH] IN BLOOD BY AUTOMATED COUNT: 15.2 % (ref 11.6–15.4)
HCT VFR BLD AUTO: 39.1 % (ref 37.5–51)
HGB BLD-MCNC: 12.4 G/DL (ref 13–17.7)
IMM GRANULOCYTES # BLD AUTO: 0 X10E3/UL (ref 0–0.1)
IMM GRANULOCYTES NFR BLD AUTO: 0 %
LYMPHOCYTES # BLD AUTO: 1.8 X10E3/UL (ref 0.7–3.1)
LYMPHOCYTES NFR BLD AUTO: 24 %
MCH RBC QN AUTO: 25.1 PG (ref 26.6–33)
MCHC RBC AUTO-ENTMCNC: 31.7 G/DL (ref 31.5–35.7)
MCV RBC AUTO: 79 FL (ref 79–97)
MONOCYTES # BLD AUTO: 0.6 X10E3/UL (ref 0.1–0.9)
MONOCYTES NFR BLD AUTO: 8 %
NEUTROPHILS # BLD AUTO: 5 X10E3/UL (ref 1.4–7)
NEUTROPHILS NFR BLD AUTO: 66 %
PLATELET # BLD AUTO: 240 X10E3/UL (ref 150–450)
RBC # BLD AUTO: 4.95 X10E6/UL (ref 4.14–5.8)
WBC # BLD AUTO: 7.6 X10E3/UL (ref 3.4–10.8)

## 2024-12-03 PROCEDURE — 3052F HG A1C>EQUAL 8.0%<EQUAL 9.0%: CPT | Performed by: NURSE PRACTITIONER

## 2024-12-03 PROCEDURE — 3078F DIAST BP <80 MM HG: CPT | Performed by: NURSE PRACTITIONER

## 2024-12-03 PROCEDURE — 99214 OFFICE O/P EST MOD 30 MIN: CPT | Performed by: NURSE PRACTITIONER

## 2024-12-03 PROCEDURE — 3077F SYST BP >= 140 MM HG: CPT | Performed by: NURSE PRACTITIONER

## 2024-12-03 RX ORDER — SEMAGLUTIDE 1.34 MG/ML
0.5 INJECTION, SOLUTION SUBCUTANEOUS
Qty: 1.5 ML | Refills: 0 | Status: ACTIVE | OUTPATIENT
Start: 2024-12-03

## 2024-12-03 ASSESSMENT — ANXIETY QUESTIONNAIRES
IF YOU CHECKED OFF ANY PROBLEMS ON THIS QUESTIONNAIRE, HOW DIFFICULT HAVE THESE PROBLEMS MADE IT FOR YOU TO DO YOUR WORK, TAKE CARE OF THINGS AT HOME, OR GET ALONG WITH OTHER PEOPLE: NOT DIFFICULT AT ALL
4. TROUBLE RELAXING: NOT AT ALL
3. WORRYING TOO MUCH ABOUT DIFFERENT THINGS: NOT AT ALL
6. BECOMING EASILY ANNOYED OR IRRITABLE: NOT AT ALL
5. BEING SO RESTLESS THAT IT IS HARD TO SIT STILL: NOT AT ALL
1. FEELING NERVOUS, ANXIOUS, OR ON EDGE: NOT AT ALL
7. FEELING AFRAID AS IF SOMETHING AWFUL MIGHT HAPPEN: NOT AT ALL
2. NOT BEING ABLE TO STOP OR CONTROL WORRYING: NOT AT ALL
GAD7 TOTAL SCORE: 0

## 2024-12-03 ASSESSMENT — PATIENT HEALTH QUESTIONNAIRE - PHQ9
SUM OF ALL RESPONSES TO PHQ QUESTIONS 1-9: 0
SUM OF ALL RESPONSES TO PHQ QUESTIONS 1-9: 0
1. LITTLE INTEREST OR PLEASURE IN DOING THINGS: NOT AT ALL
SUM OF ALL RESPONSES TO PHQ QUESTIONS 1-9: 0
SUM OF ALL RESPONSES TO PHQ9 QUESTIONS 1 & 2: 0
DEPRESSION UNABLE TO ASSESS: FUNCTIONAL CAPACITY MOTIVATION LIMITS ACCURACY
SUM OF ALL RESPONSES TO PHQ QUESTIONS 1-9: 0
2. FEELING DOWN, DEPRESSED OR HOPELESS: NOT AT ALL

## 2024-12-03 NOTE — PROGRESS NOTES
Chief Complaint   Patient presents with    Follow-up     N/A     Vitals:    12/03/24 0901   BP: (!) 156/73   Site: Left Upper Arm   Position: Sitting   Pulse: 81   Temp: 98.5 °F (36.9 °C)   TempSrc: Temporal   SpO2: 98%   Weight: (!) 200.5 kg (442 lb)   Height: 1.88 m (6' 2\")     .  \"Have you been to the ER, urgent care clinic since your last visit?  Hospitalized since your last visit?\"    NO    “Have you seen or consulted any other health care providers outside of [facility] since your last visit?”    NO        “Have you had a colorectal cancer screening such as a colonoscopy/FIT/Cologuard?    NO    No colonoscopy on file  No cologuard on file  No FIT/FOBT on file   No flexible sigmoidoscopy on file         Click Here for Release of Records Request    
consumed alcohol in excess.    The patient has been abstinent from alcohol since 4/2024.    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   Eyes: Sclera anicteric.   ENT: No oral lesions.  Thyroid normal.  Nodes: No adenopathy.   Skin: No spider angiomata.  No jaundice.  No palmar erythema.  Respiratory: Lungs clear to auscultation.   Cardiovascular: Regular heart rate.  No murmurs.  No JVD.  Abdomen: Soft non-tender, liver size normal to percussion/palpation.  Spleen not palpable. No obvious ascites.  Extremities: No edema.  No muscle wasting.  No gross arthritic changes.  Neurologic: Alert and oriented.  Cranial nerves grossly intact.  No asterixis.      LABORATORY STUDIES:  Additional lab values drawn at today's office visit are pending at the time of documentation.     Latest Ref Rng 6/12/2023 12/12/2023 5/22/2024   MARIM[facility] - Routine Labs       WBC 4.1 - 11.1 K/uL 8.5  7.6  9.0    ANC 1.8 - 8.0 K/UL 5.9  5.0  5.7    HGB 12.1 - 17.0 g/dL 12.5  12.6  12.8     - 400 K/uL 260  240  296    INR 0.9 - 1.1     1.2 (H)    AST 15 - 37 U/L 40 (H)  41 (H)  36    ALT 0 - 55 IU/L 28  28  26    ALT 12 - 78 U/L   28    Alk Phos 45 - 117 U/L 83  87  71    Bili, Total 0.0 - 1.2 mg/dL 0.7  0.7  0.4    Bili, Total 0.2 - 1.0 MG/DL   0.8    Bili, Direct 0.0 - 0.2 MG/DL   0.2    Albumin 3.5 - 5.0 g/dL 3.7  3.8  3.9    BUN 6 - 20 MG/DL 10  10  14    Creat 0.70 - 1.30 MG/DL 0.96  0.92  0.93    Na 136 - 145 mmol/L 137  137  138    K 3.5 - 5.1 mmol/L 4.1  4.1  3.9    Cl 97 - 108 mmol/L 102  102  104    CO2 21 - 32 mmol/L 29  30  27    Glucose 70 - 99 mg/dL 169 (H)  167 (H)  132 (H)    Glucose 65 - 100 mg/dL   134 (H)    Hemoglobin A1C 4.0 - 5.6 %         Latest Ref Rng 5/22/2024   [facility] -

## 2024-12-04 LAB
ALBUMIN SERPL-MCNC: 3.9 G/DL (ref 4.1–5.1)
ALP SERPL-CCNC: 73 IU/L (ref 44–121)
ALT SERPL-CCNC: 21 IU/L (ref 0–44)
AST SERPL-CCNC: 33 IU/L (ref 0–40)
BILIRUB DIRECT SERPL-MCNC: 0.32 MG/DL (ref 0–0.4)
BILIRUB SERPL-MCNC: 0.8 MG/DL (ref 0–1.2)
BUN SERPL-MCNC: 8 MG/DL (ref 6–24)
BUN/CREAT SERPL: 9 (ref 9–20)
CALCIUM SERPL-MCNC: 9.2 MG/DL (ref 8.7–10.2)
CHLORIDE SERPL-SCNC: 98 MMOL/L (ref 96–106)
CO2 SERPL-SCNC: 25 MMOL/L (ref 20–29)
CREAT SERPL-MCNC: 0.87 MG/DL (ref 0.76–1.27)
EGFRCR SERPLBLD CKD-EPI 2021: 108 ML/MIN/1.73
GLUCOSE SERPL-MCNC: 178 MG/DL (ref 70–99)
HBA1C MFR BLD: 9.2 % (ref 4.8–5.6)
POTASSIUM SERPL-SCNC: 3.9 MMOL/L (ref 3.5–5.2)
PROT SERPL-MCNC: 7.4 G/DL (ref 6–8.5)
SODIUM SERPL-SCNC: 140 MMOL/L (ref 134–144)

## 2024-12-05 LAB
A2 MACROGLOB SERPL-MCNC: 219 MG/DL (ref 110–276)
ALT SERPL W P-5'-P-CCNC: 23 IU/L (ref 0–55)
APO A-I SERPL-MCNC: 125 MG/DL (ref 101–178)
AST SERPL W P-5'-P-CCNC: 35 IU/L (ref 0–40)
BILIRUB SERPL-MCNC: 0.6 MG/DL (ref 0–1.2)
CHOLEST SERPL-MCNC: 131 MG/DL (ref 100–199)
FIBROSIS SCORING:: ABNORMAL
FIBROSIS STAGE SERPL QL: ABNORMAL
GGT SERPL-CCNC: 188 IU/L (ref 0–65)
GLUCOSE SERPL-MCNC: 194 MG/DL (ref 70–99)
HAPTOGLOB SERPL-MCNC: 370 MG/DL (ref 23–355)
LABORATORY COMMENT REPORT: ABNORMAL
LIVER FIBR SCORE SERPL CALC.FIBROSURE: 0.35 (ref 0–0.21)
LIVER STEATOSIS GRADE SERPL QL: ABNORMAL
LIVER STEATOSIS SCORE SERPL: 0.76 (ref 0–0.4)
NASH GRADE SERPL QL: ABNORMAL
NASH INTERPRETATION SERPL-IMP: ABNORMAL
NASH SCORE SERPL: 0.63 (ref 0–0.25)
NASH SCORING: ABNORMAL
STEATOSIS SCORING: ABNORMAL
TEST PERFORMANCE INFO SPEC: ABNORMAL
TEST PERFORMANCE INFO SPEC: ABNORMAL
TRIGL SERPL-MCNC: 111 MG/DL (ref 0–149)

## 2024-12-09 ENCOUNTER — OFFICE VISIT (OUTPATIENT)
Facility: CLINIC | Age: 44
End: 2024-12-09
Payer: COMMERCIAL

## 2024-12-09 VITALS
HEART RATE: 77 BPM | SYSTOLIC BLOOD PRESSURE: 114 MMHG | OXYGEN SATURATION: 97 % | TEMPERATURE: 98.1 F | BODY MASS INDEX: 40.43 KG/M2 | WEIGHT: 315 LBS | HEIGHT: 74 IN | RESPIRATION RATE: 17 BRPM | DIASTOLIC BLOOD PRESSURE: 67 MMHG

## 2024-12-09 DIAGNOSIS — G47.33 OBSTRUCTIVE SLEEP APNEA SYNDROME: ICD-10-CM

## 2024-12-09 DIAGNOSIS — I10 BENIGN HYPERTENSION: ICD-10-CM

## 2024-12-09 DIAGNOSIS — E11.9 TYPE 2 DIABETES MELLITUS WITHOUT COMPLICATION, WITHOUT LONG-TERM CURRENT USE OF INSULIN (HCC): Primary | ICD-10-CM

## 2024-12-09 DIAGNOSIS — F10.10 ALCOHOL ABUSE: ICD-10-CM

## 2024-12-09 DIAGNOSIS — E66.01 MORBID OBESITY: ICD-10-CM

## 2024-12-09 DIAGNOSIS — Z12.11 SCREENING FOR COLON CANCER: ICD-10-CM

## 2024-12-09 DIAGNOSIS — K75.81 METABOLIC DYSFUNCTION-ASSOCIATED STEATOHEPATITIS (MASH): ICD-10-CM

## 2024-12-09 PROBLEM — R74.8 ABNORMAL LEVELS OF OTHER SERUM ENZYMES: Status: RESOLVED | Noted: 2022-02-28 | Resolved: 2024-12-09

## 2024-12-09 PROCEDURE — 3046F HEMOGLOBIN A1C LEVEL >9.0%: CPT | Performed by: FAMILY MEDICINE

## 2024-12-09 PROCEDURE — 3078F DIAST BP <80 MM HG: CPT | Performed by: FAMILY MEDICINE

## 2024-12-09 PROCEDURE — 99214 OFFICE O/P EST MOD 30 MIN: CPT | Performed by: FAMILY MEDICINE

## 2024-12-09 PROCEDURE — 3074F SYST BP LT 130 MM HG: CPT | Performed by: FAMILY MEDICINE

## 2024-12-09 RX ORDER — INSULIN GLARGINE 100 [IU]/ML
10 INJECTION, SOLUTION SUBCUTANEOUS NIGHTLY
Qty: 5 ADJUSTABLE DOSE PRE-FILLED PEN SYRINGE | Refills: 5 | Status: SHIPPED | OUTPATIENT
Start: 2024-12-09

## 2024-12-09 RX ORDER — PNEUMOCOCCAL 20-VALENT CONJUGATE VACCINE 2.2; 2.2; 2.2; 2.2; 2.2; 2.2; 2.2; 2.2; 2.2; 2.2; 2.2; 2.2; 2.2; 2.2; 2.2; 2.2; 4.4; 2.2; 2.2; 2.2 UG/.5ML; UG/.5ML; UG/.5ML; UG/.5ML; UG/.5ML; UG/.5ML; UG/.5ML; UG/.5ML; UG/.5ML; UG/.5ML; UG/.5ML; UG/.5ML; UG/.5ML; UG/.5ML; UG/.5ML; UG/.5ML; UG/.5ML; UG/.5ML; UG/.5ML; UG/.5ML
0.5 INJECTION, SUSPENSION INTRAMUSCULAR ONCE
Qty: 1 EACH | Refills: 0 | Status: SHIPPED | OUTPATIENT
Start: 2024-12-09 | End: 2024-12-09

## 2024-12-09 RX ORDER — NALTREXONE HYDROCHLORIDE 50 MG/1
50 TABLET, FILM COATED ORAL DAILY
Qty: 30 TABLET | Refills: 3
Start: 2024-12-09

## 2024-12-09 RX ORDER — PEN NEEDLE, DIABETIC 31 G X1/4"
1 NEEDLE, DISPOSABLE MISCELLANEOUS DAILY
Qty: 100 EACH | Refills: 3 | Status: SHIPPED | OUTPATIENT
Start: 2024-12-09

## 2024-12-09 RX ORDER — HYDRALAZINE HYDROCHLORIDE 100 MG/1
100 TABLET, FILM COATED ORAL 3 TIMES DAILY
Qty: 360 TABLET | Refills: 1 | Status: SHIPPED | OUTPATIENT
Start: 2024-12-09

## 2024-12-09 SDOH — ECONOMIC STABILITY: FOOD INSECURITY: WITHIN THE PAST 12 MONTHS, YOU WORRIED THAT YOUR FOOD WOULD RUN OUT BEFORE YOU GOT MONEY TO BUY MORE.: NEVER TRUE

## 2024-12-09 SDOH — ECONOMIC STABILITY: FOOD INSECURITY: WITHIN THE PAST 12 MONTHS, THE FOOD YOU BOUGHT JUST DIDN'T LAST AND YOU DIDN'T HAVE MONEY TO GET MORE.: NEVER TRUE

## 2024-12-09 SDOH — ECONOMIC STABILITY: INCOME INSECURITY: HOW HARD IS IT FOR YOU TO PAY FOR THE VERY BASICS LIKE FOOD, HOUSING, MEDICAL CARE, AND HEATING?: NOT HARD AT ALL

## 2024-12-09 ASSESSMENT — ENCOUNTER SYMPTOMS
COUGH: 0
SHORTNESS OF BREATH: 0

## 2024-12-09 NOTE — ASSESSMENT & PLAN NOTE
RECOMMENDATIONS given include: D/W patient the risks of Obesity long term and D/W patient diet and exercise for medically needed weight loss. . Encouraged average 1 to 1.5 pound weight loss per week with diet and activity changes. Avoid eating while watching television or reading.         I reviewed medications and side effects in detail

## 2024-12-09 NOTE — PROGRESS NOTES
Chief Complaint   Patient presents with    Follow-up Chronic Condition         \"Have you been to the ER, urgent care clinic since your last visit?  Hospitalized since your last visit?\"    NO    “Have you seen or consulted any other health care providers outside of [facility] since your last visit?”    YES- HEPATOLOGY      “Have you had a colorectal cancer screening such as a colonoscopy/FIT/Cologuard?    NO         Retinal- my eye doc [place]      Click Here for Release of Records Request     Health Maintenance Due   Topic Date Due    Diabetic Alb to Cr ratio (uACR) test  03/09/2023    Pneumococcal 0-64 years Vaccine (2 of 2 - PCV) 03/09/2023    Diabetic retinal exam  06/10/2023    Colorectal Cancer Screen  Never done       
reviewed with patient,  Send me fsbs log in 1-2 weeks on lantus 10 units qhs.     Orders:    Microalbumin / Creatinine Urine Ratio; Future    insulin glargine (LANTUS SOLOSTAR) 100 UNIT/ML injection pen; Inject 10 Units into the skin nightly    Insulin Pen Needle ([employer] PEN NEEDLES) 31G X 6 MM MISC; 1 each by Does not apply route daily    metFORMIN (GLUCOPHAGE) 1000 MG tablet; Take 1 tablet by mouth 2 times daily (with meals)    Microalbumin / Creatinine Urine Ratio    Obstructive sleep apnea syndrome   Call sleep med clinic regarding cpap         Metabolic dysfunction-associated steatohepatitis (MASH)   Followed by hepatology         Alcohol abuse    Previously given naltrexone but did not start it, advised to take daily,     Orders:    naltrexone (DEPADE) 50 MG tablet; Take 1 tablet by mouth daily    Screening for colon cancer       Orders:    Occult Blood Stool Immunoassay; Future    Occult Blood Stool Immunoassay    Benign hypertension   Chronic, at goal (stable), continue current treatment plan    Orders:    hydrALAZINE (APRESOLINE) 100 MG tablet; Take 1 tablet by mouth 3 times daily    Morbid obesity     RECOMMENDATIONS given include: D/W patient the risks of Obesity long term and D/W patient diet and exercise for medically needed weight loss. . Encouraged average 1 to 1.5 pound weight loss per week with diet and activity changes. Avoid eating while watching television or reading.         I reviewed medications and side effects in detail  Return in about 3 months (around 3/9/2025) for DM.   I have discussed the diagnosis with the patient and the intended plan as seen in the above orders.  Social history, medical history, and labs were reviewed.  The patient has received an after-visit summary and questions were answered concerning future plans.  I have discussed medication side effects and warnings with the patient as well. Patient verbalized understanding and accepts plan & risks.        Hien Mehta,

## 2024-12-09 NOTE — ASSESSMENT & PLAN NOTE
Uncontrolled, the following changes in treatment are made start lantus, lab results and schedule of future lab studies reviewed with patient,  Send me fsbs log in 1-2 weeks on lantus 10 units qhs.     Orders:    Microalbumin / Creatinine Urine Ratio; Future    insulin glargine (LANTUS SOLOSTAR) 100 UNIT/ML injection pen; Inject 10 Units into the skin nightly    Insulin Pen Needle ([employer] PEN NEEDLES) 31G X 6 MM MISC; 1 each by Does not apply route daily    metFORMIN (GLUCOPHAGE) 1000 MG tablet; Take 1 tablet by mouth 2 times daily (with meals)    Microalbumin / Creatinine Urine Ratio

## 2024-12-09 NOTE — ASSESSMENT & PLAN NOTE
Chronic, at goal (stable), continue current treatment plan    Orders:    hydrALAZINE (APRESOLINE) 100 MG tablet; Take 1 tablet by mouth 3 times daily

## 2024-12-10 LAB
CREAT UR-MCNC: 237 MG/DL
MICROALBUMIN UR-MCNC: 53.7 MG/DL
MICROALBUMIN/CREAT UR-RTO: 227 MG/G (ref 0–30)
SPECIMEN HOLD: NORMAL

## 2025-01-02 DIAGNOSIS — I10 BENIGN HYPERTENSION: ICD-10-CM

## 2025-01-07 RX ORDER — HYDRALAZINE HYDROCHLORIDE 100 MG/1
100 TABLET, FILM COATED ORAL 3 TIMES DAILY
Qty: 360 TABLET | Refills: 1 | Status: SHIPPED | OUTPATIENT
Start: 2025-01-07

## 2025-02-11 LAB — HEMOCCULT STL QL IA: NEGATIVE

## 2025-03-07 SDOH — ECONOMIC STABILITY: FOOD INSECURITY: WITHIN THE PAST 12 MONTHS, THE FOOD YOU BOUGHT JUST DIDN'T LAST AND YOU DIDN'T HAVE MONEY TO GET MORE.: NEVER TRUE

## 2025-03-07 SDOH — ECONOMIC STABILITY: TRANSPORTATION INSECURITY
IN THE PAST 12 MONTHS, HAS THE LACK OF TRANSPORTATION KEPT YOU FROM MEDICAL APPOINTMENTS OR FROM GETTING MEDICATIONS?: NO

## 2025-03-07 SDOH — ECONOMIC STABILITY: INCOME INSECURITY: IN THE LAST 12 MONTHS, WAS THERE A TIME WHEN YOU WERE NOT ABLE TO PAY THE MORTGAGE OR RENT ON TIME?: NO

## 2025-03-07 SDOH — ECONOMIC STABILITY: FOOD INSECURITY: WITHIN THE PAST 12 MONTHS, YOU WORRIED THAT YOUR FOOD WOULD RUN OUT BEFORE YOU GOT MONEY TO BUY MORE.: NEVER TRUE

## 2025-03-21 ENCOUNTER — OFFICE VISIT (OUTPATIENT)
Facility: CLINIC | Age: 45
End: 2025-03-21
Payer: COMMERCIAL

## 2025-03-21 VITALS
TEMPERATURE: 98.7 F | RESPIRATION RATE: 18 BRPM | WEIGHT: 315 LBS | SYSTOLIC BLOOD PRESSURE: 109 MMHG | OXYGEN SATURATION: 98 % | HEIGHT: 74 IN | BODY MASS INDEX: 40.43 KG/M2 | HEART RATE: 77 BPM | DIASTOLIC BLOOD PRESSURE: 66 MMHG

## 2025-03-21 DIAGNOSIS — E11.65 TYPE 2 DIABETES MELLITUS WITH HYPERGLYCEMIA, WITHOUT LONG-TERM CURRENT USE OF INSULIN (HCC): Primary | ICD-10-CM

## 2025-03-21 DIAGNOSIS — B35.1 ONYCHOMYCOSIS: ICD-10-CM

## 2025-03-21 LAB — HBA1C MFR BLD: 9.8 %

## 2025-03-21 PROCEDURE — 3074F SYST BP LT 130 MM HG: CPT | Performed by: FAMILY MEDICINE

## 2025-03-21 PROCEDURE — 3078F DIAST BP <80 MM HG: CPT | Performed by: FAMILY MEDICINE

## 2025-03-21 PROCEDURE — 99214 OFFICE O/P EST MOD 30 MIN: CPT | Performed by: FAMILY MEDICINE

## 2025-03-21 PROCEDURE — 83036 HEMOGLOBIN GLYCOSYLATED A1C: CPT | Performed by: FAMILY MEDICINE

## 2025-03-21 PROCEDURE — 3046F HEMOGLOBIN A1C LEVEL >9.0%: CPT | Performed by: FAMILY MEDICINE

## 2025-03-21 RX ORDER — INSULIN GLARGINE 100 [IU]/ML
20 INJECTION, SOLUTION SUBCUTANEOUS NIGHTLY
Qty: 5 ADJUSTABLE DOSE PRE-FILLED PEN SYRINGE | Refills: 5 | Status: SHIPPED | OUTPATIENT
Start: 2025-03-21

## 2025-03-21 ASSESSMENT — PATIENT HEALTH QUESTIONNAIRE - PHQ9
SUM OF ALL RESPONSES TO PHQ QUESTIONS 1-9: 0
1. LITTLE INTEREST OR PLEASURE IN DOING THINGS: NOT AT ALL
SUM OF ALL RESPONSES TO PHQ QUESTIONS 1-9: 0
2. FEELING DOWN, DEPRESSED OR HOPELESS: NOT AT ALL

## 2025-03-21 NOTE — ASSESSMENT & PLAN NOTE
Chronic, not at goal (unstable), changes made today: increase insulin, discussed proper technique for giving insulin, titrate up slowly to 20 units once he makes sure he is giving it correctly,I reviewed diet, exercise and weight control, and reviewed medications and side effects in detail    Orders:    AMB POC HEMOGLOBIN A1C    insulin glargine (LANTUS SOLOSTAR) 100 UNIT/ML injection pen; Inject 20 Units into the skin nightly    External Referral To Podiatry     DIABETES FOOT EXAM    COLLECTION CAPILLARY BLOOD SPECIMEN

## 2025-03-21 NOTE — PATIENT INSTRUCTIONS
Do 10 units this weekend like we talked about. Check fsbs on Monday and send me readings. We may go up slowly 2 units every 2-3 days. Max 20 units once daily.

## 2025-03-21 NOTE — PROGRESS NOTES
Chief Complaint   Patient presents with    Follow-up Chronic Condition         \"Have you been to the ER, urgent care clinic since your last visit?  Hospitalized since your last visit?\"    NO    “Have you seen or consulted any other health care providers outside of [facility] since your last visit?”    NO            Click Here for Release of Records Request     Health Maintenance Due   Topic Date Due    Pneumococcal 0-49 years Vaccine (2 of 2 - PCV) 03/09/2023    COVID-19 Vaccine (5 - 2024-25 season) 09/01/2024    A1C test (Diabetic or Prediabetic)  03/03/2025     
Onychomycosis  B35.1 External Referral To Podiatry        Assessment & Plan  Type 2 diabetes mellitus with hyperglycemia, without long-term current use of insulin (HCC)   Chronic, not at goal (unstable), changes made today: increase insulin, discussed proper technique for giving insulin, titrate up slowly to 20 units once he makes sure he is giving it correctly, I reviewed diet, exercise and weight control, and reviewed medications and side effects in detail    Orders:    AMB POC HEMOGLOBIN A1C    insulin glargine (LANTUS SOLOSTAR) 100 UNIT/ML injection pen; Inject 20 Units into the skin nightly    External Referral To Podiatry     DIABETES FOOT EXAM    COLLECTION CAPILLARY BLOOD SPECIMEN    Onychomycosis       Orders:    External Referral To Podiatry    Return in about 3 months (around 7/1/2025).   I have discussed the diagnosis with the patient and the intended plan as seen in the above orders.  Social history, medical history, and labs were reviewed.  The patient has received an after-visit summary and questions were answered concerning future plans.  I have discussed medication side effects and warnings with the patient as well. Patient verbalized understanding and accepts plan & risks.        Hien Mehta MD  [facility]  03/21/25

## 2025-06-03 ENCOUNTER — OFFICE VISIT (OUTPATIENT)
Age: 45
End: 2025-06-03
Payer: COMMERCIAL

## 2025-06-03 VITALS
RESPIRATION RATE: 20 BRPM | BODY MASS INDEX: 40.43 KG/M2 | OXYGEN SATURATION: 94 % | HEIGHT: 74 IN | WEIGHT: 315 LBS | HEART RATE: 86 BPM | DIASTOLIC BLOOD PRESSURE: 88 MMHG | TEMPERATURE: 98.2 F | SYSTOLIC BLOOD PRESSURE: 154 MMHG

## 2025-06-03 DIAGNOSIS — K75.81 METABOLIC DYSFUNCTION-ASSOCIATED STEATOHEPATITIS (MASH): Primary | ICD-10-CM

## 2025-06-03 PROCEDURE — 3077F SYST BP >= 140 MM HG: CPT | Performed by: NURSE PRACTITIONER

## 2025-06-03 PROCEDURE — 3079F DIAST BP 80-89 MM HG: CPT | Performed by: NURSE PRACTITIONER

## 2025-06-03 PROCEDURE — 99214 OFFICE O/P EST MOD 30 MIN: CPT | Performed by: NURSE PRACTITIONER

## 2025-06-03 ASSESSMENT — PATIENT HEALTH QUESTIONNAIRE - PHQ9
1. LITTLE INTEREST OR PLEASURE IN DOING THINGS: NOT AT ALL
2. FEELING DOWN, DEPRESSED OR HOPELESS: NOT AT ALL
SUM OF ALL RESPONSES TO PHQ QUESTIONS 1-9: 0

## 2025-06-03 NOTE — PROGRESS NOTES
Patient identified with two identification factors, Name and Date of Birth.    Chief Complaint   Patient presents with    Follow-up     Metabolic dysfunction-associated steatohepatitis        BP (!) 154/88 (BP Site: Left Lower Arm, Patient Position: Sitting, BP Cuff Size: Large Adult)   Pulse 86   Temp 98.2 °F (36.8 °C) (Temporal)   Resp 20   Ht 1.88 m (6' 2\")   Wt (!) 199.1 kg (439 lb)   SpO2 94%   BMI 56.36 kg/m²       1. \"Have you been to the ER, urgent care clinic since your last visit?  Hospitalized since your last visit?\" No     2. \"Have you seen or consulted any other health care providers outside of the [facility] since your last visit?\" Yes Podiatrist in May 2025     
  Bili, Direct 0.00 - 0.40 mg/dL 0.2  0.32    Albumin 4.1 - 5.1 g/dL 3.9  3.9 (L)    BUN 6 - 24 mg/dL 14  8    Creat 0.76 - 1.27 mg/dL 0.93  0.87    Na 134 - 144 mmol/L 138  140    K 3.5 - 5.2 mmol/L 3.9  3.9    Cl 96 - 106 mmol/L 104  98    CO2 20 - 29 mmol/L 27  25    Glucose 70 - 99 mg/dL 132 (H)  178 (H)    Glucose 70 - 99 mg/dL 134 (H)  194 (H)    Hemoglobin A1C 4.8 - 5.6 %  9.2 (H)          Latest Ref Rng 5/22/2024   [facility] - Cancer Screening     AFP 0.0 - 6.9 ng/mL 3.7    AFP-L3% 0.0 - 9.9 % 10.3 (H)         7/2022  AFP 3.6  4/2022  AFP 4.1    SEROLOGIES:   Latest Ref Rng 5/22/2024   [facility] - Serologies     Hep A Ab, Total Negative   Positive !    Hep B Surface Ab mIU/mL 3.34    Hep B S Ab Interp NONREACTIVE   NONREACTIVE    Ferritin 26 - 388 NG/ML 88    Iron % Saturation 20 - 50 % 15 (L)         6/2023.  HCV antibody non-reactive.     4/2022.    HAV total negative,   anti-HBcore negative,   anti-HBsurface negative,   anti-HCV negative,  CAMILLA negative, positive,  ASMA negative, positive,  ceruloplsmin 28.5,   alpha-1-antitrypsin 185.      LIVER HISTOLOGY:  5/2024. SEN Fibrosure. Fibrosis score 0.37 consistent with F1-2 fibrosis. Steatosis score 0.37 consistent with moderate to severe steatosis   12/2024. SEN Fibrosure. Fibrosis score 0.35 consistent with F1-2 fibrosis. Steatosis score 0.76 consistent with moderate to severe steatosis     ENDOSCOPIC PROCEDURES:  Not available or performed    RADIOLOGY:  3/2021.  CT scan abdomen with IV contrast.  Changes consistent with cirrhosis. No liver mass lesions.  No dilated bile ducts.    No ascites. Normal spleen.    7/2022.  Ultrasound of liver.  Echogenic consistent with steatotic liver disease (SLD).  No liver mass lesions.  No dilated bile ducts.  No ascites.    6/2024.  Ultrasound of liver.  Echogenic consistent with steatotic liver disease (SLD).  No liver mass lesions.  No dilated bile ducts. Cholelithiasis.  No ascites.      OTHER TESTING:  Not available or

## 2025-06-14 DIAGNOSIS — I10 BENIGN HYPERTENSION: ICD-10-CM

## 2025-06-14 DIAGNOSIS — E11.9 TYPE 2 DIABETES MELLITUS WITHOUT COMPLICATION, WITHOUT LONG-TERM CURRENT USE OF INSULIN (HCC): ICD-10-CM

## 2025-06-16 RX ORDER — AMLODIPINE BESYLATE 10 MG/1
10 TABLET ORAL DAILY
Qty: 90 TABLET | Refills: 0 | OUTPATIENT
Start: 2025-06-16

## 2025-06-16 RX ORDER — AMLODIPINE BESYLATE 10 MG/1
10 TABLET ORAL DAILY
Qty: 90 TABLET | Refills: 3 | Status: SHIPPED | OUTPATIENT
Start: 2025-06-16

## 2025-06-17 DIAGNOSIS — I10 BENIGN HYPERTENSION: ICD-10-CM

## 2025-06-17 RX ORDER — AMLODIPINE BESYLATE 10 MG/1
10 TABLET ORAL DAILY
Qty: 90 TABLET | Refills: 3 | OUTPATIENT
Start: 2025-06-17

## 2025-06-24 ENCOUNTER — OFFICE VISIT (OUTPATIENT)
Facility: CLINIC | Age: 45
End: 2025-06-24
Payer: COMMERCIAL

## 2025-06-24 VITALS
OXYGEN SATURATION: 95 % | TEMPERATURE: 97.9 F | BODY MASS INDEX: 56.88 KG/M2 | RESPIRATION RATE: 16 BRPM | HEART RATE: 75 BPM | SYSTOLIC BLOOD PRESSURE: 131 MMHG | DIASTOLIC BLOOD PRESSURE: 79 MMHG | WEIGHT: 315 LBS

## 2025-06-24 DIAGNOSIS — Z79.4 TYPE 2 DIABETES MELLITUS WITH HYPERGLYCEMIA, WITH LONG-TERM CURRENT USE OF INSULIN (HCC): Primary | ICD-10-CM

## 2025-06-24 DIAGNOSIS — I10 BENIGN HYPERTENSION: ICD-10-CM

## 2025-06-24 DIAGNOSIS — K75.81 METABOLIC DYSFUNCTION-ASSOCIATED STEATOHEPATITIS (MASH): ICD-10-CM

## 2025-06-24 DIAGNOSIS — E11.65 TYPE 2 DIABETES MELLITUS WITH HYPERGLYCEMIA, WITH LONG-TERM CURRENT USE OF INSULIN (HCC): Primary | ICD-10-CM

## 2025-06-24 DIAGNOSIS — E78.2 MIXED HYPERLIPIDEMIA: ICD-10-CM

## 2025-06-24 DIAGNOSIS — Z79.899 LONG TERM USE OF DRUG: ICD-10-CM

## 2025-06-24 DIAGNOSIS — E66.01 MORBID OBESITY (HCC): ICD-10-CM

## 2025-06-24 LAB — HBA1C MFR BLD: 9.1 %

## 2025-06-24 PROCEDURE — 99214 OFFICE O/P EST MOD 30 MIN: CPT | Performed by: FAMILY MEDICINE

## 2025-06-24 PROCEDURE — 3078F DIAST BP <80 MM HG: CPT | Performed by: FAMILY MEDICINE

## 2025-06-24 PROCEDURE — 3046F HEMOGLOBIN A1C LEVEL >9.0%: CPT | Performed by: FAMILY MEDICINE

## 2025-06-24 PROCEDURE — 83036 HEMOGLOBIN GLYCOSYLATED A1C: CPT | Performed by: FAMILY MEDICINE

## 2025-06-24 PROCEDURE — 3075F SYST BP GE 130 - 139MM HG: CPT | Performed by: FAMILY MEDICINE

## 2025-06-24 RX ORDER — METOPROLOL TARTRATE 100 MG/1
100 TABLET ORAL 2 TIMES DAILY
Qty: 180 TABLET | Refills: 3 | Status: SHIPPED | OUTPATIENT
Start: 2025-06-24

## 2025-06-24 RX ORDER — ATORVASTATIN CALCIUM 40 MG/1
40 TABLET, FILM COATED ORAL DAILY
Qty: 90 TABLET | Refills: 3 | Status: SHIPPED | OUTPATIENT
Start: 2025-06-24

## 2025-06-24 RX ORDER — HYDROCHLOROTHIAZIDE 25 MG/1
25 TABLET ORAL DAILY
Qty: 90 TABLET | Refills: 3 | Status: SHIPPED | OUTPATIENT
Start: 2025-06-24

## 2025-06-24 RX ORDER — INSULIN GLARGINE 100 [IU]/ML
30 INJECTION, SOLUTION SUBCUTANEOUS NIGHTLY
Qty: 5 ADJUSTABLE DOSE PRE-FILLED PEN SYRINGE | Refills: 5 | Status: SHIPPED | OUTPATIENT
Start: 2025-06-24

## 2025-06-24 RX ORDER — KETOCONAZOLE 20 MG/G
CREAM TOPICAL
COMMUNITY
Start: 2025-06-16

## 2025-06-24 RX ORDER — HYDRALAZINE HYDROCHLORIDE 100 MG/1
100 TABLET, FILM COATED ORAL 3 TIMES DAILY
Qty: 360 TABLET | Refills: 1 | Status: SHIPPED | OUTPATIENT
Start: 2025-06-24

## 2025-06-24 ASSESSMENT — ENCOUNTER SYMPTOMS
SHORTNESS OF BREATH: 0
COUGH: 0
BLOOD IN STOOL: 0

## 2025-06-24 NOTE — PROGRESS NOTES
Chief Complaint   Patient presents with    Follow-up Chronic Condition        \"Have you been to the ER, urgent care clinic since your last visit?  Hospitalized since your last visit?\"    NO    “Have you seen or consulted any other health care providers outside of [facility] since your last visit?”    Podiatry new age foot and ankle, liver doctor           Click Here for Release of Records Request     Health Maintenance Due   Topic Date Due    Pneumococcal 0-49 years Vaccine (2 of 2 - PCV) 03/09/2023    COVID-19 Vaccine (5 - 2024-25 season) 09/01/2024    Lipids  06/06/2025    A1C test (Diabetic or Prediabetic)  06/21/2025

## 2025-06-24 NOTE — PROGRESS NOTES
[facility]  Chief Complaint   Patient presents with    Follow-up Chronic Condition       History of Present Illness:   Jordi White Jr. is a 44 y.o. male       HPI:  Here for f/u diabetes. Started lantus in 12/24, 10 units and increased to 20 units in 3/25. Has not checked fsbs lately but was FSBS 150s-230s before increasing to 20 units.     Etoh cut back a lot on his own, never tried naltrexone.   Saw podiatry and hepatology.  Based upon laboratory studies and imaging and Fibrsoure, the patient appears to have significant liver injury but not cirrhosis.  Hepatology recommended resmetirom but not covered by insurance. Could not afford ozempic or jardiance    Hemoglobin A1C   Date Value Ref Range Status   12/03/2024 9.2 (H) 4.8 - 5.6 % Final     Comment:                 Prediabetes: 5.7 - 6.4           Diabetes: >6.4           Glycemic control for adults with diabetes: <7.0       Hemoglobin A1C, POC   Date Value Ref Range Status   06/24/2025 9.1 % Final       Health Maintenance  Health Maintenance Due   Topic Date Due    Pneumococcal 0-49 years Vaccine (2 of 2 - PCV) 03/09/2023    COVID-19 Vaccine (5 - 2024-25 season) 09/01/2024    Lipids  06/06/2025       Past Medical, Family, and Social History:     Past Medical History:   Diagnosis Date    Benign hypertension 2/28/2022    Elevated liver enzymes 2/28/2022    CT showed severe hepatic steatosis with developing surface nodularity to the liver, suspect early cirrhosis Referred to hepatology    Mild intermittent asthma without complication 3/3/2022    Mixed hyperlipidemia 2/28/2022    Morbid obesity (HCC) 2/28/2022    Obstructive sleep apnea syndrome 2/28/2022      History reviewed. No pertinent surgical history.    Current Outpatient Medications on File Prior to Visit   Medication Sig Dispense Refill    ketoconazole (NIZORAL) 2 % cream APPLY 1 GRAM TO AFFECTED AREA ON EACH FOOT ONCE DAILY FOR 6 WEEKS      amLODIPine (NORVASC) 10 MG tablet Take

## 2025-06-24 NOTE — ASSESSMENT & PLAN NOTE
Chronic, at goal (stable), continue current treatment plan  Lab Results   Component Value Date    LDL 55 06/06/2024       Orders:    Lipid Panel; Future    atorvastatin (LIPITOR) 40 MG tablet; Take 1 tablet by mouth daily

## 2025-06-24 NOTE — ASSESSMENT & PLAN NOTE
Chronic, at goal (stable), continue current treatment plan    Orders:    hydrALAZINE (APRESOLINE) 100 MG tablet; Take 1 tablet by mouth 3 times daily    hydroCHLOROthiazide (HYDRODIURIL) 25 MG tablet; Take 1 tablet by mouth daily    metoprolol (LOPRESSOR) 100 MG tablet; Take 1 tablet by mouth 2 times daily

## 2025-06-25 LAB
ALBUMIN SERPL-MCNC: 3.6 G/DL (ref 3.5–5)
ALBUMIN/GLOB SERPL: 0.9 (ref 1.1–2.2)
ALP SERPL-CCNC: 75 U/L (ref 45–117)
ALT SERPL-CCNC: 22 U/L (ref 12–78)
ANION GAP SERPL CALC-SCNC: 5 MMOL/L (ref 2–12)
AST SERPL-CCNC: 15 U/L (ref 15–37)
BASOPHILS # BLD: 0.03 K/UL (ref 0–0.1)
BASOPHILS NFR BLD: 0.4 % (ref 0–1)
BILIRUB SERPL-MCNC: 0.7 MG/DL (ref 0.2–1)
BUN SERPL-MCNC: 8 MG/DL (ref 6–20)
BUN/CREAT SERPL: 9 (ref 12–20)
CALCIUM SERPL-MCNC: 9.2 MG/DL (ref 8.5–10.1)
CHLORIDE SERPL-SCNC: 102 MMOL/L (ref 97–108)
CHOLEST SERPL-MCNC: 124 MG/DL
CO2 SERPL-SCNC: 30 MMOL/L (ref 21–32)
CREAT SERPL-MCNC: 0.93 MG/DL (ref 0.7–1.3)
DIFFERENTIAL METHOD BLD: ABNORMAL
EOSINOPHIL # BLD: 0.09 K/UL (ref 0–0.4)
EOSINOPHIL NFR BLD: 1.2 % (ref 0–7)
ERYTHROCYTE [DISTWIDTH] IN BLOOD BY AUTOMATED COUNT: 17.9 % (ref 11.5–14.5)
GLOBULIN SER CALC-MCNC: 4 G/DL (ref 2–4)
GLUCOSE SERPL-MCNC: 203 MG/DL (ref 65–100)
HCT VFR BLD AUTO: 39.2 % (ref 36.6–50.3)
HDLC SERPL-MCNC: 41 MG/DL
HDLC SERPL: 3 (ref 0–5)
HGB BLD-MCNC: 12 G/DL (ref 12.1–17)
IMM GRANULOCYTES # BLD AUTO: 0.02 K/UL (ref 0–0.04)
IMM GRANULOCYTES NFR BLD AUTO: 0.3 % (ref 0–0.5)
LDLC SERPL CALC-MCNC: 58.4 MG/DL (ref 0–100)
LYMPHOCYTES # BLD: 1.68 K/UL (ref 0.8–3.5)
LYMPHOCYTES NFR BLD: 21.7 % (ref 12–49)
MCH RBC QN AUTO: 25.1 PG (ref 26–34)
MCHC RBC AUTO-ENTMCNC: 30.6 G/DL (ref 30–36.5)
MCV RBC AUTO: 82 FL (ref 80–99)
MONOCYTES # BLD: 0.56 K/UL (ref 0–1)
MONOCYTES NFR BLD: 7.2 % (ref 5–13)
NEUTS SEG # BLD: 5.37 K/UL (ref 1.8–8)
NEUTS SEG NFR BLD: 69.2 % (ref 32–75)
NRBC # BLD: 0 K/UL (ref 0–0.01)
NRBC BLD-RTO: 0 PER 100 WBC
PLATELET # BLD AUTO: 250 K/UL (ref 150–400)
PMV BLD AUTO: 11.9 FL (ref 8.9–12.9)
POTASSIUM SERPL-SCNC: 4.1 MMOL/L (ref 3.5–5.1)
PROT SERPL-MCNC: 7.6 G/DL (ref 6.4–8.2)
RBC # BLD AUTO: 4.78 M/UL (ref 4.1–5.7)
SODIUM SERPL-SCNC: 137 MMOL/L (ref 136–145)
TRIGL SERPL-MCNC: 123 MG/DL
VLDLC SERPL CALC-MCNC: 24.6 MG/DL
WBC # BLD AUTO: 7.8 K/UL (ref 4.1–11.1)

## 2025-06-27 ENCOUNTER — RESULTS FOLLOW-UP (OUTPATIENT)
Facility: CLINIC | Age: 45
End: 2025-06-27

## 2025-07-06 DIAGNOSIS — I10 BENIGN HYPERTENSION: ICD-10-CM

## 2025-07-07 RX ORDER — METOPROLOL TARTRATE 100 MG/1
100 TABLET ORAL 2 TIMES DAILY
Qty: 180 TABLET | Refills: 3 | OUTPATIENT
Start: 2025-07-07

## 2025-07-25 DIAGNOSIS — I10 BENIGN HYPERTENSION: ICD-10-CM

## 2025-07-26 RX ORDER — HYDRALAZINE HYDROCHLORIDE 100 MG/1
100 TABLET, FILM COATED ORAL 3 TIMES DAILY
Qty: 360 TABLET | Refills: 1 | Status: SHIPPED | OUTPATIENT
Start: 2025-07-26

## 2025-08-22 DIAGNOSIS — I10 BENIGN HYPERTENSION: ICD-10-CM

## 2025-08-22 DIAGNOSIS — E78.2 MIXED HYPERLIPIDEMIA: ICD-10-CM

## 2025-08-25 RX ORDER — HYDROCHLOROTHIAZIDE 25 MG/1
25 TABLET ORAL DAILY
Qty: 90 TABLET | Refills: 3 | Status: SHIPPED | OUTPATIENT
Start: 2025-08-25

## 2025-08-25 RX ORDER — ATORVASTATIN CALCIUM 40 MG/1
40 TABLET, FILM COATED ORAL DAILY
Qty: 90 TABLET | Refills: 3 | Status: SHIPPED | OUTPATIENT
Start: 2025-08-25